# Patient Record
Sex: FEMALE | Race: WHITE | Employment: OTHER | URBAN - METROPOLITAN AREA
[De-identification: names, ages, dates, MRNs, and addresses within clinical notes are randomized per-mention and may not be internally consistent; named-entity substitution may affect disease eponyms.]

---

## 2017-02-16 DIAGNOSIS — I10 HTN (HYPERTENSION), BENIGN: ICD-10-CM

## 2017-02-16 RX ORDER — LISINOPRIL AND HYDROCHLOROTHIAZIDE 10; 12.5 MG/1; MG/1
TABLET ORAL
Qty: 90 TAB | Refills: 1 | Status: SHIPPED | OUTPATIENT
Start: 2017-02-16 | End: 2017-03-14 | Stop reason: SINTOL

## 2017-03-14 ENCOUNTER — OFFICE VISIT (OUTPATIENT)
Dept: INTERNAL MEDICINE CLINIC | Age: 77
End: 2017-03-14

## 2017-03-14 VITALS
TEMPERATURE: 98 F | BODY MASS INDEX: 26.5 KG/M2 | SYSTOLIC BLOOD PRESSURE: 129 MMHG | HEIGHT: 62 IN | HEART RATE: 81 BPM | DIASTOLIC BLOOD PRESSURE: 77 MMHG | WEIGHT: 144 LBS

## 2017-03-14 DIAGNOSIS — I10 HTN (HYPERTENSION), BENIGN: ICD-10-CM

## 2017-03-14 DIAGNOSIS — Z01.00 EYE EXAM, ROUTINE: ICD-10-CM

## 2017-03-14 DIAGNOSIS — G47.00 PERSISTENT DISORDER OF INITIATING OR MAINTAINING SLEEP: ICD-10-CM

## 2017-03-14 DIAGNOSIS — T46.4X5A ACE-INHIBITOR COUGH: ICD-10-CM

## 2017-03-14 DIAGNOSIS — E78.5 DYSLIPIDEMIA: ICD-10-CM

## 2017-03-14 DIAGNOSIS — R05.8 ACE-INHIBITOR COUGH: ICD-10-CM

## 2017-03-14 DIAGNOSIS — Z00.00 ROUTINE GENERAL MEDICAL EXAMINATION AT A HEALTH CARE FACILITY: ICD-10-CM

## 2017-03-14 DIAGNOSIS — E03.3 POSTINFECTIOUS HYPOTHYROIDISM: Primary | ICD-10-CM

## 2017-03-14 RX ORDER — CODEINE PHOSPHATE AND GUAIFENESIN 10; 100 MG/5ML; MG/5ML
5 SOLUTION ORAL
Qty: 180 ML | Refills: 0 | Status: SHIPPED | OUTPATIENT
Start: 2017-03-14 | End: 2017-09-28 | Stop reason: ALTCHOICE

## 2017-03-14 RX ORDER — ZOLPIDEM TARTRATE 5 MG/1
5 TABLET ORAL
Qty: 30 TAB | Refills: 0 | Status: SHIPPED | OUTPATIENT
Start: 2017-03-14 | End: 2017-09-28 | Stop reason: SDUPTHER

## 2017-03-14 RX ORDER — LOSARTAN POTASSIUM AND HYDROCHLOROTHIAZIDE 12.5; 5 MG/1; MG/1
1 TABLET ORAL DAILY
Qty: 90 TAB | Refills: 1 | Status: SHIPPED | OUTPATIENT
Start: 2017-03-14 | End: 2017-07-19 | Stop reason: SDUPTHER

## 2017-03-14 NOTE — PROGRESS NOTES
SUBJECTIVE: Shonda Mendoza is a 77 y.o. female seen for a follow up visit; she has hypertension, hyperlipidemia and no known cardiovascular conditions. Current Outpatient Prescriptions   Medication Sig Dispense Refill    zolpidem (AMBIEN) 5 mg tablet Take 1 Tab by mouth nightly as needed. 30 Tab 0    guaiFENesin-codeine (ROBITUSSIN AC) 100-10 mg/5 mL solution Take 5 mL by mouth three (3) times daily as needed for Cough. Max Daily Amount: 15 mL. 180 mL 0    losartan-hydroCHLOROthiazide (HYZAAR) 50-12.5 mg per tablet Take 1 Tab by mouth daily. 90 Tab 1    levothyroxine (SYNTHROID) 50 mcg tablet TAKE 1 TABLET DAILY BEFORE BREAKFAST 90 Tab 3    olopatadine 0.7 % drop Apply 1 Drop to eye two (2) times daily as needed. Each eye daily 3 Bottle 1    selenium sulfide (SELSUN) 2.5 % shampoo As directed 120 mL 2    aspirin delayed-release 81 mg tablet Take 81 mg by mouth daily.  diphenhydrAMINE (BENADRYL) 25 mg capsule Take 25 mg by mouth nightly as needed. Takes one nightly      MULTIVITAMINS (MULTIVITAMIN PO) Take  by mouth daily.  loratadine (CLARITIN) 10 mg tablet Take 10 mg by mouth daily.  conjugated estrogens (PREMARIN) 0.625 mg/gram vaginal cream Insert 0.5 g into vagina every Monday, Thursday, Saturday. 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 New concerns: cough at times.      OBJECTIVE:  Visit Vitals    /77 (BP 1 Location: Left arm, BP Patient Position: Sitting)    Pulse 81    Temp 98 °F (36.7 °C) (Oral)    Ht 5' 2\" (1.575 m)    Wt 144 lb (65.3 kg)    BMI 26.34 kg/m2      Appearance: alert, well appearing, and in no distress, oriented to person, place, and time and normal appearing weight. General exam: CVS exam BP noted to be well controlled today in office, S1, S2 normal, no gallop, no murmur, chest clear, no JVD, no HSM, no edema. Lab review: orders written for new lab studies as appropriate; see orders. ASSESSMENT:  hypertension well controlled, side effect of ace cough so switch to ARB, hyperlipidemia poorly controlled, needs further observation, may need treating. PLAN:  the following changes are made - see orders. Thyroid ROS: denies fatigue, weight changes, heat/cold intolerance, bowel/skin changes or CVS symptoms. Lab Results   Component Value Date/Time    TSH 2.290 04/20/2016 12:00 AM     Exam: thyroid is normal in size without nodules or tenderness. Assessment:  hypothyroidism stable. Plan: current treatment plan effective, no change in therapy. This is a Subsequent Medicare Annual Wellness Visit providing Personalized Prevention Plan Services (PPPS) (Performed 12 months after initial AWV and PPPS )    I have reviewed the patient's medical history in detail and updated the computerized patient record.      History     Past Medical History:   Diagnosis Date    Allergic rhinitis     Arthritis     spinal stenosis    Family history of colon cancer 2/12/2013    Hemorrhoids     Leg cramps     Other ill-defined conditions(799.89) 2010    multiple cortisone injections to back    Persistent disorder of initiating or maintaining sleep     Spinal stenosis 1/21/2011    Thyroid disease     borderline      Past Surgical History:   Procedure Laterality Date    HX REFRACTIVE SURGERY      HX TONSIL AND ADENOIDECTOMY      [place] COLONOSCOPY FLX DX W/COLLJ SPEC WHEN PFRMD 11/30/2010          Current Outpatient Prescriptions   Medication Sig Dispense Refill    zolpidem (AMBIEN) 5 mg tablet Take 1 Tab by mouth nightly as needed. 30 Tab 0    guaiFENesin-codeine (ROBITUSSIN AC) 100-10 mg/5 mL solution Take 5 mL by mouth three (3) times daily as needed for Cough. Max Daily Amount: 15 mL. 180 mL 0    losartan-hydroCHLOROthiazide (HYZAAR) 50-12.5 mg per tablet Take 1 Tab by mouth daily. 90 Tab 1    levothyroxine (SYNTHROID) 50 mcg tablet TAKE 1 TABLET DAILY BEFORE BREAKFAST 90 Tab 3    olopatadine 0.7 % drop Apply 1 Drop to eye two (2) times daily as needed. Each eye daily 3 Bottle 1    selenium sulfide (SELSUN) 2.5 % shampoo As directed 120 mL 2    aspirin delayed-release 81 mg tablet Take 81 mg by mouth daily.  diphenhydrAMINE (BENADRYL) 25 mg capsule Take 25 mg by mouth nightly as needed. Takes one nightly      MULTIVITAMINS (MULTIVITAMIN PO) Take  by mouth daily.  loratadine (CLARITIN) 10 mg tablet Take 10 mg by mouth daily.  conjugated estrogens (PREMARIN) 0.625 mg/gram vaginal cream Insert 0.5 g into vagina every Monday, Thursday, Saturday.  45 g 10     Allergies   Allergen Reactions    Other Medication Anaphylaxis     Allergic to lobster (specific)     Family History   Problem Relation Age of Onset    Heart Disease Mother     Asthma Mother     Hypertension Mother     Cancer Father      colon    Hypertension Father     Thyroid Disease Sister     Heart Disease Brother     Hypertension Brother     Stroke Brother      Social History   Substance Use Topics    Smoking status: Former Smoker     Quit date: 11/29/1969    Smokeless tobacco: Never Used      Comment: quit years ago    Alcohol use 1.5 oz/week     3 Glasses of wine per week     Patient Active Problem List   Diagnosis Code    Allergic rhinitis 477    Leg cramps R25.2    Hemorrhoids 455    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 Requires assistance with: no ADLs    Fall Risk     Fall Risk Assessment, last 12 mths 3/14/2017   Able to walk? Yes   Fall in past 12 months? No     Abuse Screen   Patient is not abused    Review of Systems   A comprehensive review of systems was negative except for that written in the HPI. Physical Examination     Evaluation of Cognitive Function:  Mood/affect:  happy  Appearance: age appropriate and casually dressed  Family member/caregiver input: no    Visit Vitals    /77 (BP 1 Location: Left arm, BP Patient Position: Sitting)    Pulse 81    Temp 98 °F (36.7 °C) (Oral)    Ht 5' 2\" (1.575 m)    Wt 144 lb (65.3 kg)    BMI 26.34 kg/m2     General appearance: alert, cooperative, no distress, appears stated age    Patient Care Team:  Avery Loving MD as PCP - General (Internal Medicine)    Advice/Referrals/Counseling   Education and counseling provided:  Are appropriate based on today's review and evaluation  End-of-Life planning (with patient's consent)  Pneumococcal Vaccine  Influenza Vaccine  Screening Mammography  Colorectal cancer screening tests      Assessment/Plan   Carlos A Cunha was seen today for hypertension. Diagnoses and all orders for this visit:    Postinfectious hypothyroidism  -     TSH 3RD GENERATION    Persistent disorder of initiating or maintaining sleep  -     zolpidem (AMBIEN) 5 mg tablet; Take 1 Tab by mouth nightly as needed.     HTN (hypertension), benign  -     TSH 3RD GENERATION  -     LIPID PANEL  -     CBC WITH AUTOMATED DIFF  -     METABOLIC PANEL, COMPREHENSIVE    Dyslipidemia  -     TSH 3RD GENERATION  -     LIPID PANEL  -     CBC WITH AUTOMATED DIFF  -     METABOLIC PANEL, COMPREHENSIVE    Eye exam, routine  -     REFERRAL TO OPHTHALMOLOGY    ACE-inhibitor cough    Routine general medical examination at a health care facility    Other orders  -     guaiFENesin-codeine (ROBITUSSIN AC) 100-10 mg/5 mL solution; Take 5 mL by mouth three (3) times daily as needed for Cough. Max Daily Amount: 15 mL. -     losartan-hydroCHLOROthiazide (HYZAAR) 50-12.5 mg per tablet; Take 1 Tab by mouth daily. Mabeline Sink

## 2017-03-14 NOTE — MR AVS SNAPSHOT
Visit Information Date & Time Provider Department Dept. Phone Encounter #  
 3/14/2017  9:45 AM Deirdre Obregon MD [facility] [phone] [id number] Upcoming Health Maintenance Date Due  
 MEDICARE YEARLY EXAM 4/21/2017 GLAUCOMA SCREENING Q2Y 3/31/2018 COLONOSCOPY 2/12/2020 DTaP/Tdap/Td series (2 - Td) 11/12/2020 Allergies as of 3/14/2017  Review Complete On: 3/14/2017 By: Lillian Teague LPN Severity Noted Reaction Type Reactions Other Medication High 11/29/2010   Systemic Anaphylaxis Allergic to lobster (specific) Current Immunizations  Reviewed on 10/19/2016 Name Date Influenza High Dose Vaccine PF 10/19/2016, 1/8/2016 Influenza Vaccine 9/23/2013, 10/12/2012 Pneumococcal Conjugate (PCV-13) 5/30/2016 Pneumococcal Vaccine (Unspecified Type) 1/21/2011 Tdap 11/12/2010 Zoster Vaccine, Live 2/1/2012 Not reviewed this visit You Were Diagnosed With   
  
 Codes Comments Postinfectious hypothyroidism    -  Primary ICD-10-CM: E03.3 ICD-9-CM: 244.9 Persistent disorder of initiating or maintaining sleep     ICD-10-CM: G47.00 ICD-9-CM: 307.42   
 HTN (hypertension), benign     ICD-10-CM: I10 
ICD-9-CM: 401.1 Dyslipidemia     ICD-10-CM: E78.5 ICD-9-CM: 272.4 Eye exam, routine     ICD-10-CM: Z01.00 ICD-9-CM: V72.0 ACE-inhibitor cough     ICD-10-CM: R05, T46.4X5A 
ICD-9-CM: 786.2, E942.6 Vitals BP Pulse Temp Height(growth percentile) Weight(growth percentile) BMI  
 129/77 (BP 1 Location: Left arm, BP Patient Position: Sitting) 81 98 °F (36.7 °C) (Oral) 5' 2\" (1.575 m) 144 lb (65.3 kg) 26.34 kg/m2 OB Status Smoking Status Postmenopausal Former Smoker Vitals History BMI and BSA Data Body Mass Index Body Surface Area  
 26.34 kg/m 2 1.69 m 2 Preferred Pharmacy Pharmacy Name Phone [address] [phone] Your Updated Medication List  
  
   
This list is accurate as of: 3/14/17 10:29 AM.  Always use your most recent med list.  
  
  
  
  
 aspirin delayed-release 81 mg tablet Take 81 mg by mouth daily. BENADRYL 25 mg capsule Generic drug:  diphenhydrAMINE Take 25 mg by mouth nightly as needed. Takes one nightly  
  
 conjugated estrogens 0.625 mg/gram vaginal cream  
Commonly known as:  PREMARIN Insert 0.5 g into vagina every Monday, Thursday, Saturday. guaiFENesin-codeine 100-10 mg/5 mL solution Commonly known as:  ROBITUSSIN AC Take 5 mL by mouth three (3) times daily as needed for Cough. Max Daily Amount: 15 mL. levothyroxine 50 mcg tablet Commonly known as:  SYNTHROID  
TAKE 1 TABLET DAILY BEFORE BREAKFAST  
  
 loratadine 10 mg tablet Commonly known as:  Coco Yuval Take 10 mg by mouth daily. losartan-hydroCHLOROthiazide 50-12.5 mg per tablet Commonly known as:  HYZAAR Take 1 Tab by mouth daily. MULTIVITAMIN PO Take  by mouth daily. olopatadine 0.7 % Drop Apply 1 Drop to eye two (2) times daily as needed. Each eye daily  
  
 selenium sulfide 2.5 % shampoo Commonly known as:  Fleurette Batters As directed  
  
 zolpidem 5 mg tablet Commonly known as:  AMBIEN Take 1 Tab by mouth nightly as needed. Prescriptions Printed Refills  
 zolpidem (AMBIEN) 5 mg tablet 0 Sig: Take 1 Tab by mouth nightly as needed. Class: Print Route: Oral  
 guaiFENesin-codeine (ROBITUSSIN AC) 100-10 mg/5 mL solution 0 Sig: Take 5 mL by mouth three (3) times daily as needed for Cough. Max Daily Amount: 15 mL. Class: Print Route: Oral  
  
Prescriptions Sent to Pharmacy Refills  
 losartan-hydroCHLOROthiazide (HYZAAR) 50-12.5 mg per tablet 1 Sig: Take 1 Tab by mouth daily.   
 Class: Normal  
 Pharmacy: [facility], [facility] [place] [place] [address] #: [phone] Route: Oral  
  
We Performed the Following CBC WITH AUTOMATED DIFF [50295 CPT(R)] LIPID PANEL [79717 CPT(R)] METABOLIC PANEL, COMPREHENSIVE [56580 CPT(R)] REFERRAL TO OPHTHALMOLOGY [REF57 Custom] TSH 3RD GENERATION [65538 CPT(R)] Referral Information Referral ID Referred By Referred To  
  
 [id number] Monica Miranda. Jed Karimi DO, [facility]   
   [place], [address] Visits Status Start Date End Date 1 New Request 3/14/17 3/14/18 If your referral has a status of pending review or denied, additional information will be sent to support the outcome of this decision. Introducing [facility] & HEALTH SERVICES! Dear Ruth Simms: 
Thank you for requesting a [employer] account. Our records indicate that you already have an active [employer] account. You can access your account anytime at [email or web address] Pay by [employer]pping (deal united)/Zetera Did you know that you can access your hospital and ER discharge instructions at any time in [employer]? You can also review all of your test results from your hospital stay or ER visit. Additional Information If you have questions, please visit the Frequently Asked Questions section of the [employer] website at [email or web address] by [employer] (deal united)/[email or web address]/. Remember, [employer] is NOT to be used for urgent needs. For medical emergencies, dial 911. Now available from your iPhone and Android! Please provide this summary of care documentation to your next provider. Your primary care clinician is listed as Delia Schmidt. If you have any questions after today's visit, please call [phone].

## 2017-03-15 LAB
ALBUMIN SERPL-MCNC: 4.9 G/DL (ref 3.5–4.8)
ALBUMIN/GLOB SERPL: 2.3 {RATIO} (ref 1.2–2.2)
ALP SERPL-CCNC: 69 IU/L (ref 39–117)
ALT SERPL-CCNC: 16 IU/L (ref 0–32)
AST SERPL-CCNC: 18 IU/L (ref 0–40)
BASOPHILS # BLD AUTO: 0 X10E3/UL (ref 0–0.2)
BASOPHILS NFR BLD AUTO: 0 %
BILIRUB SERPL-MCNC: 0.5 MG/DL (ref 0–1.2)
BUN SERPL-MCNC: 17 MG/DL (ref 8–27)
BUN/CREAT SERPL: 22 (ref 11–26)
CALCIUM SERPL-MCNC: 10 MG/DL (ref 8.7–10.3)
CHLORIDE SERPL-SCNC: 96 MMOL/L (ref 96–106)
CHOLEST SERPL-MCNC: 278 MG/DL (ref 100–199)
CO2 SERPL-SCNC: 25 MMOL/L (ref 18–29)
CREAT SERPL-MCNC: 0.76 MG/DL (ref 0.57–1)
EOSINOPHIL # BLD AUTO: 0.5 X10E3/UL (ref 0–0.4)
EOSINOPHIL NFR BLD AUTO: 6 %
ERYTHROCYTE [DISTWIDTH] IN BLOOD BY AUTOMATED COUNT: 13.1 % (ref 12.3–15.4)
GLOBULIN SER CALC-MCNC: 2.1 G/DL (ref 1.5–4.5)
GLUCOSE SERPL-MCNC: 105 MG/DL (ref 65–99)
HCT VFR BLD AUTO: 40.8 % (ref 34–46.6)
HDLC SERPL-MCNC: 119 MG/DL
HGB BLD-MCNC: 13.7 G/DL (ref 11.1–15.9)
IMM GRANULOCYTES # BLD: 0 X10E3/UL (ref 0–0.1)
IMM GRANULOCYTES NFR BLD: 0 %
INTERPRETATION, 910389: NORMAL
LDLC SERPL CALC-MCNC: 144 MG/DL (ref 0–99)
LYMPHOCYTES # BLD AUTO: 1.8 X10E3/UL (ref 0.7–3.1)
LYMPHOCYTES NFR BLD AUTO: 24 %
MCH RBC QN AUTO: 31.1 PG (ref 26.6–33)
MCHC RBC AUTO-ENTMCNC: 33.6 G/DL (ref 31.5–35.7)
MCV RBC AUTO: 93 FL (ref 79–97)
MONOCYTES # BLD AUTO: 0.6 X10E3/UL (ref 0.1–0.9)
MONOCYTES NFR BLD AUTO: 8 %
NEUTROPHILS # BLD AUTO: 4.6 X10E3/UL (ref 1.4–7)
NEUTROPHILS NFR BLD AUTO: 62 %
PLATELET # BLD AUTO: 317 X10E3/UL (ref 150–379)
POTASSIUM SERPL-SCNC: 4.6 MMOL/L (ref 3.5–5.2)
PROT SERPL-MCNC: 7 G/DL (ref 6–8.5)
RBC # BLD AUTO: 4.41 X10E6/UL (ref 3.77–5.28)
SODIUM SERPL-SCNC: 139 MMOL/L (ref 134–144)
TRIGL SERPL-MCNC: 73 MG/DL (ref 0–149)
TSH SERPL DL<=0.005 MIU/L-ACNC: 2.48 UIU/ML (ref 0.45–4.5)
VLDLC SERPL CALC-MCNC: 15 MG/DL (ref 5–40)
WBC # BLD AUTO: 7.6 X10E3/UL (ref 3.4–10.8)

## 2017-03-16 RX ORDER — ATORVASTATIN CALCIUM 20 MG/1
20 TABLET, FILM COATED ORAL DAILY
Qty: 90 TAB | Refills: 3 | Status: SHIPPED | OUTPATIENT
Start: 2017-03-16 | End: 2018-05-18 | Stop reason: SDUPTHER

## 2017-07-19 RX ORDER — LOSARTAN POTASSIUM AND HYDROCHLOROTHIAZIDE 12.5; 5 MG/1; MG/1
TABLET ORAL
Qty: 90 TAB | Refills: 1 | Status: SHIPPED | OUTPATIENT
Start: 2017-07-19 | End: 2017-12-15 | Stop reason: SDUPTHER

## 2017-07-19 RX ORDER — LEVOTHYROXINE SODIUM 50 UG/1
TABLET ORAL
Qty: 90 TAB | Refills: 3 | Status: SHIPPED | OUTPATIENT
Start: 2017-07-19 | End: 2017-09-28 | Stop reason: SDUPTHER

## 2017-09-28 ENCOUNTER — OFFICE VISIT (OUTPATIENT)
Dept: INTERNAL MEDICINE CLINIC | Age: 77
End: 2017-09-28

## 2017-09-28 VITALS
DIASTOLIC BLOOD PRESSURE: 80 MMHG | HEIGHT: 62 IN | HEART RATE: 96 BPM | WEIGHT: 144 LBS | SYSTOLIC BLOOD PRESSURE: 116 MMHG | BODY MASS INDEX: 26.5 KG/M2 | OXYGEN SATURATION: 98 % | RESPIRATION RATE: 16 BRPM

## 2017-09-28 DIAGNOSIS — Z23 ENCOUNTER FOR IMMUNIZATION: ICD-10-CM

## 2017-09-28 DIAGNOSIS — M24.549 CONTRACTURE OF HAND JOINT, UNSPECIFIED LATERALITY: ICD-10-CM

## 2017-09-28 DIAGNOSIS — G47.00 PERSISTENT DISORDER OF INITIATING OR MAINTAINING SLEEP: ICD-10-CM

## 2017-09-28 DIAGNOSIS — I10 HTN (HYPERTENSION), BENIGN: Primary | ICD-10-CM

## 2017-09-28 RX ORDER — ZOLPIDEM TARTRATE 5 MG/1
5 TABLET ORAL
Qty: 30 TAB | Refills: 0 | Status: SHIPPED | OUTPATIENT
Start: 2017-09-28 | End: 2018-03-28 | Stop reason: SDUPTHER

## 2017-09-28 RX ORDER — LEVOTHYROXINE SODIUM 50 UG/1
TABLET ORAL
Qty: 30 TAB | Refills: 0 | Status: SHIPPED | OUTPATIENT
Start: 2017-09-28 | End: 2018-08-16 | Stop reason: SDUPTHER

## 2017-09-28 NOTE — PROGRESS NOTES
Chief Complaint   Patient presents with    Follow-up     6 mon      SUBJECTIVE: Yanet Lopez is a 77 y.o. female seen for a follow up visit; she has hypertension, hyperlipidemia and no known cardiovascular conditions. Current Outpatient Prescriptions   Medication Sig Dispense Refill    losartan-hydroCHLOROthiazide (HYZAAR) 50-12.5 mg per tablet TAKE 1 TABLET EVERY DAY 90 Tab 1    levothyroxine (SYNTHROID) 50 mcg tablet TAKE 1 TABLET EVERY DAY BEFORE BREAKFAST 90 Tab 3    atorvastatin (LIPITOR) 20 mg tablet Take 1 Tab by mouth daily. 90 Tab 3    zolpidem (AMBIEN) 5 mg tablet Take 1 Tab by mouth nightly as needed. 30 Tab 0    olopatadine 0.7 % drop Apply 1 Drop to eye two (2) times daily as needed. Each eye daily 3 Bottle 1    selenium sulfide (SELSUN) 2.5 % shampoo As directed 120 mL 2    conjugated estrogens (PREMARIN) 0.625 mg/gram vaginal cream Insert 0.5 g into vagina every Monday, Thursday, Saturday. 45 g 10    aspirin delayed-release 81 mg tablet Take 81 mg by mouth daily.  diphenhydrAMINE (BENADRYL) 25 mg capsule Take 25 mg by mouth nightly as needed. Takes one nightly      MULTIVITAMINS (MULTIVITAMIN PO) Take  by mouth daily.  loratadine (CLARITIN) 10 mg tablet Take 10 mg by mouth daily.        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 New concerns: hand tingling rare and  Contractures bilat    OBJECTIVE:  Visit Vitals    /60    Pulse 96    Resp 16    Ht 5' 2\" (1.575 m)    Wt 144 lb (65.3 kg)    SpO2 98%    BMI 26.34 kg/m2      Appearance: alert, well appearing, and in no distress and oriented to person, place, and time. General exam: CVS exam BP noted to be well controlled today in office, S1, S2 normal, no gallop, no murmur, chest clear, no JVD, no HSM, no edema. dup contractures bilat minimal  Lab review: labs reviewed, I note that lipids LDL result does not yet meet goal.     ASSESSMENT:  hypertension well controlled, hyperlipidemia no significant medication side effects noted, needs further observation, needs improvement. PLAN:  current treatment plan is effective, no change in therapy  lab results and schedule of future lab studies reviewed with patient  repeat labs ordered prior to next appointment  orders and follow up as documented in patient record  cardiovascular risk and specific lipid/LDL goals reviewed  discuss statin use. Wt Readings from Last 3 Encounters:   09/28/17 144 lb (65.3 kg)   03/14/17 144 lb (65.3 kg)   10/19/16 140 lb (63.5 kg)     BP Readings from Last 3 Encounters:   09/28/17 110/60   03/14/17 129/77   10/19/16 122/78           Hypertension controlled for age based on guidelines    Her exercise level wonderful    1. Persistent disorder of initiating or maintaining sleep  occ refill  - zolpidem (AMBIEN) 5 mg tablet; Take 1 Tab by mouth nightly as needed. Dispense: 30 Tab; Refill: 0    2. Encounter for immunization    - Influenza virus vaccine ([place] 80) 72 years and older (71063)    3. HTN (hypertension), benign  Controlled cough resolved off ace    4.  Contracture of hand joint, unspecified laterality  No treatment

## 2017-09-28 NOTE — MR AVS SNAPSHOT
Visit Information Date & Time Provider Department Dept. Phone Encounter #  
 9/28/2017  1:45 PM Benton Ramírez, [facility] [phone] [id number] Upcoming Health Maintenance Date Due INFLUENZA AGE 9 TO ADULT 8/1/2017 MEDICARE YEARLY EXAM 3/15/2018 GLAUCOMA SCREENING Q2Y 4/24/2019 COLONOSCOPY 2/12/2020 DTaP/Tdap/Td series (2 - Td) 11/12/2020 Allergies as of 9/28/2017  Review Complete On: 9/28/2017 By: Caryle Ny, LPN Severity Noted Reaction Type Reactions Other Medication High 11/29/2010   Systemic Anaphylaxis Allergic to lobster (specific) Ace Inhibitors  09/28/2017    Cough Current Immunizations  Reviewed on 10/19/2016 Name Date Influenza High Dose Vaccine PF  Incomplete, 10/19/2016, 1/8/2016 Influenza Vaccine 9/23/2013, 10/12/2012 Pneumococcal Conjugate (PCV-13) 5/30/2016 Tdap 11/12/2010 ZZZ-RETIRED (DO NOT USE) Pneumococcal Vaccine (Unspecified Type) 1/21/2011 Zoster Vaccine, Live 2/1/2012 Not reviewed this visit You Were Diagnosed With   
  
 Codes Comments Persistent disorder of initiating or maintaining sleep    -  Primary ICD-10-CM: G47.00 ICD-9-CM: 307.42 Encounter for immunization     ICD-10-CM: V58 ICD-9-CM: V03.89 Vitals BP Pulse Resp Height(growth percentile) Weight(growth percentile) SpO2  
 116/80 96 16 5' 2\" (1.575 m) 144 lb (65.3 kg) 98% BMI OB Status Smoking Status 26.34 kg/m2 Postmenopausal Former Smoker Vitals History BMI and BSA Data Body Mass Index Body Surface Area  
 26.34 kg/m 2 1.69 m 2 Preferred Pharmacy Pharmacy Name Phone [id number] [place] [phone] Your Updated Medication List  
  
   
This list is accurate as of: 9/28/17  2:20 PM.  Always use your most recent med list.  
  
  
  
  
 aspirin delayed-release 81 mg tablet Take 81 mg by mouth daily. atorvastatin 20 mg tablet Commonly known as:  LIPITOR Take 1 Tab by mouth daily. BENADRYL 25 mg capsule Generic drug:  diphenhydrAMINE Take 25 mg by mouth nightly as needed. Takes one nightly  
  
 conjugated estrogens 0.625 mg/gram vaginal cream  
Commonly known as:  PREMARIN Insert 0.5 g into vagina every Monday, Thursday, Saturday. levothyroxine 50 mcg tablet Commonly known as:  SYNTHROID  
TAKE 1 TABLET EVERY DAY BEFORE BREAKFAST  
  
 loratadine 10 mg tablet Commonly known as:  Kathlmann Bird Take 10 mg by mouth daily. losartan-hydroCHLOROthiazide 50-12.5 mg per tablet Commonly known as:  HYZAAR  
TAKE 1 TABLET EVERY DAY  
  
 MULTIVITAMIN PO Take  by mouth daily. olopatadine 0.7 % Drop Apply 1 Drop to eye two (2) times daily as needed. Each eye daily  
  
 selenium sulfide 2.5 % shampoo Commonly known as:  Alonzo Reynolds As directed  
  
 zolpidem 5 mg tablet Commonly known as:  AMBIEN Take 1 Tab by mouth nightly as needed. Prescriptions Printed Refills  
 zolpidem (AMBIEN) 5 mg tablet 0 Sig: Take 1 Tab by mouth nightly as needed. Class: Print Route: Oral  
  
Prescriptions Sent to Pharmacy Refills  
 levothyroxine (SYNTHROID) 50 mcg tablet 0 Sig: TAKE 1 TABLET EVERY DAY BEFORE BREAKFAST Class: Normal  
 Pharmacy: [facility] [place], [facility] [place] Ph #: [phone] We Performed the Following INFLUENZA VIRUS VACCINE, HIGH DOSE SEASONAL, PRESERVATIVE FREE [57731 CPT(R)] Introducing [facility] [place] & [facility]! Dear [facility]: 
Thank you for requesting a [employer] account. Our records indicate that you already have an active [employer] account. You can access your account anytime at [email or web address] Did you know that you can access your hospital and ER discharge instructions at any time in [employer]?   You can also review all of your test results from your hospital stay or ER visit. Additional Information If you have questions, please visit the Frequently Asked Questions section of the [employer] website at [email or web address] Remember, [employer] is NOT to be used for urgent needs. For medical emergencies, dial 911. Now available from your iPhone and Android! Please provide this summary of care documentation to your next provider. Your primary care clinician is listed as Maggie Brown. If you have any questions after today's visit, please call [phone].

## 2017-12-16 RX ORDER — LOSARTAN POTASSIUM AND HYDROCHLOROTHIAZIDE 12.5; 5 MG/1; MG/1
TABLET ORAL
Qty: 90 TAB | Refills: 1 | Status: SHIPPED | OUTPATIENT
Start: 2017-12-16 | End: 2018-08-16 | Stop reason: SDUPTHER

## 2018-03-28 ENCOUNTER — OFFICE VISIT (OUTPATIENT)
Dept: INTERNAL MEDICINE CLINIC | Age: 78
End: 2018-03-28

## 2018-03-28 VITALS
RESPIRATION RATE: 16 BRPM | HEIGHT: 62 IN | BODY MASS INDEX: 27.23 KG/M2 | HEART RATE: 64 BPM | OXYGEN SATURATION: 98 % | DIASTOLIC BLOOD PRESSURE: 80 MMHG | WEIGHT: 148 LBS | SYSTOLIC BLOOD PRESSURE: 142 MMHG

## 2018-03-28 DIAGNOSIS — E78.5 DYSLIPIDEMIA: ICD-10-CM

## 2018-03-28 DIAGNOSIS — I10 HTN (HYPERTENSION), BENIGN: Primary | ICD-10-CM

## 2018-03-28 DIAGNOSIS — Z13.39 SCREENING FOR ALCOHOLISM: ICD-10-CM

## 2018-03-28 DIAGNOSIS — G47.00 PERSISTENT DISORDER OF INITIATING OR MAINTAINING SLEEP: ICD-10-CM

## 2018-03-28 DIAGNOSIS — Z00.00 MEDICARE ANNUAL WELLNESS VISIT, SUBSEQUENT: ICD-10-CM

## 2018-03-28 DIAGNOSIS — E03.9 ADULT HYPOTHYROIDISM: ICD-10-CM

## 2018-03-28 RX ORDER — ZOLPIDEM TARTRATE 5 MG/1
5 TABLET ORAL
Qty: 30 TAB | Refills: 0 | Status: SHIPPED | OUTPATIENT
Start: 2018-03-28 | End: 2019-02-07 | Stop reason: SDUPTHER

## 2018-03-28 NOTE — PROGRESS NOTES
Chief Complaint   Patient presents with   [employer] Annual Wellness Visit     need lab done,          This is the Subsequent Medicare Annual Wellness Exam, performed 12 months or more after the Initial AWV or the last Subsequent AWV    I have reviewed the patient's medical history in detail and updated the computerized patient record. History     Past Medical History:   Diagnosis Date    Allergic rhinitis     Arthritis     spinal stenosis    Family history of colon cancer 2/12/2013    Hemorrhoids     Leg cramps     Other ill-defined conditions(799.89) 2010    multiple cortisone injections to back    Persistent disorder of initiating or maintaining sleep     Spinal stenosis 1/21/2011    Thyroid disease     borderline      Past Surgical History:   Procedure Laterality Date    HX REFRACTIVE SURGERY      HX TONSIL AND ADENOIDECTOMY      [place] COLONOSCOPY FLX DX W/COLLJ SPEC WHEN PFRMD  11/30/2010          Current Outpatient Prescriptions   Medication Sig Dispense Refill    losartan-hydroCHLOROthiazide (HYZAAR) 50-12.5 mg per tablet TAKE 1 TABLET EVERY DAY 90 Tab 1    levothyroxine (SYNTHROID) 50 mcg tablet TAKE 1 TABLET EVERY DAY BEFORE BREAKFAST 30 Tab 0    zolpidem (AMBIEN) 5 mg tablet Take 1 Tab by mouth nightly as needed. 30 Tab 0    atorvastatin (LIPITOR) 20 mg tablet Take 1 Tab by mouth daily. 90 Tab 3    olopatadine 0.7 % drop Apply 1 Drop to eye two (2) times daily as needed. Each eye daily 3 Bottle 1    selenium sulfide (SELSUN) 2.5 % shampoo As directed 120 mL 2    conjugated estrogens (PREMARIN) 0.625 mg/gram vaginal cream Insert 0.5 g into vagina every Monday, Thursday, Saturday. 45 g 10    aspirin delayed-release 81 mg tablet Take 81 mg by mouth daily.  diphenhydrAMINE (BENADRYL) 25 mg capsule Take 25 mg by mouth nightly as needed. Takes one nightly      MULTIVITAMINS (MULTIVITAMIN PO) Take  by mouth daily.  loratadine (CLARITIN) 10 mg tablet Take 10 mg by mouth daily. Allergies   Allergen Reactions    Other Medication Anaphylaxis     Allergic to lobster (specific)    Ace Inhibitors Cough     Family History   Problem Relation Age of Onset    Heart Disease Mother     Asthma Mother     Hypertension Mother     Cancer Father      colon    Hypertension Father     Thyroid Disease Sister     Heart Disease Brother     Hypertension Brother     Stroke Brother      Social History   Substance Use Topics    Smoking status: Former Smoker     Quit date: 11/29/1969    Smokeless tobacco: Never Used      Comment: quit years ago    Alcohol use 1.5 oz/week     3 Glasses of wine per week     Patient Active Problem List   Diagnosis Code    Allergic rhinitis 80    Leg cramps R25.2    Hemorrhoids 65    Persistent disorder of initiating or maintaining sleep G47.00    Spinal stenosis M48.00    Adult hypothyroidism E03.9    Family history of colon cancer Z80.0    HTN (hypertension), benign I10       Depression Risk Factor Screening:     PHQ over the last two weeks 3/28/2018   Little interest or pleasure in doing things Not at all   Feeling down, depressed or hopeless -   Total Score PHQ 2 -     Alcohol Risk Factor Screening: You do not drink alcohol or very rarely. Functional Ability and Level of Safety:   Hearing Loss  Hearing is good. Activities of Daily Living  The home contains: no safety equipment. Patient does total self care    Fall Risk  Fall Risk Assessment, last 12 mths 3/28/2018   Able to walk? Yes   Fall in past 12 months?  No       Abuse Screen  Patient is not abused    Cognitive Screening   Evaluation of Cognitive Function:  Has your family/caregiver stated any concerns about your memory: no  Normal    Patient Care Team   Patient Care Team:  Herlinda Fernandez MD as PCP - General (Internal Medicine)    Assessment/Plan   Education and counseling provided:  Are appropriate based on today's review and evaluation  End-of-Life planning (with patient's consent)    Diagnoses and all orders for this visit:    1. Persistent disorder of initiating or maintaining sleep  -     zolpidem (AMBIEN) 5 mg tablet; Take 1 Tab by mouth nightly as needed. 2. Medicare annual wellness visit, subsequent    3. Screening for alcoholism  -     Annual  Alcohol Screen 15 min ()        Health Maintenance Due   Topic Date Due    MEDICARE YEARLY EXAM  03/15/2018           SUBJECTIVE: Jas Thorne is a 69 y.o. female seen for a follow up visit; she has hypertension, hyperlipidemia and no known cardiovascular conditions. Current Outpatient Prescriptions   Medication Sig Dispense Refill    losartan-hydroCHLOROthiazide (HYZAAR) 50-12.5 mg per tablet TAKE 1 TABLET EVERY DAY 90 Tab 1    levothyroxine (SYNTHROID) 50 mcg tablet TAKE 1 TABLET EVERY DAY BEFORE BREAKFAST 30 Tab 0    zolpidem (AMBIEN) 5 mg tablet Take 1 Tab by mouth nightly as needed. 30 Tab 0    atorvastatin (LIPITOR) 20 mg tablet Take 1 Tab by mouth daily. (Patient taking differently: Take 10 mg by mouth daily.) 90 Tab 3    olopatadine 0.7 % drop Apply 1 Drop to eye two (2) times daily as needed. Each eye daily 3 Bottle 1    selenium sulfide (SELSUN) 2.5 % shampoo As directed 120 mL 2    conjugated estrogens (PREMARIN) 0.625 mg/gram vaginal cream Insert 0.5 g into vagina every Monday, Thursday, Saturday. 45 g 10    aspirin delayed-release 81 mg tablet Take 81 mg by mouth daily.  diphenhydrAMINE (BENADRYL) 25 mg capsule Take 25 mg by mouth nightly as needed. Takes one nightly      MULTIVITAMINS (MULTIVITAMIN PO) Take  by mouth daily.  loratadine (CLARITIN) 10 mg tablet Take 10 mg by mouth daily.        Patient Active Problem List   Diagnosis Code    Allergic rhinitis 80    Leg cramps R25.2    Hemorrhoids 455    Persistent disorder of initiating or maintaining sleep G47.00    Spinal stenosis M48.00    Adult hypothyroidism E03.9    Family history of colon cancer Z80.0    HTN (hypertension), benign I10     System Review: Cardiovascular ROS - taking medications as instructed, no medication side effects noted, patient does not perform home BP monitoring, no TIA's, no chest pain on exertion, no dyspnea on exertion, no swelling of ankles. New concerns: on 10 lipitor  1/2 pill of 20 mg. OBJECTIVE:  Visit Vitals    /78    Pulse 64    Resp 16    Ht 5' 2\" (1.575 m)    Wt 148 lb (67.1 kg)    SpO2 98%    BMI 27.07 kg/m2      Appearance: alert, well appearing, and in no distress and oriented to person, place, and time. General exam: CVS exam BP noted to be well controlled today in office, S1, S2 normal, no gallop, no murmur, chest clear, no JVD, no HSM, no edema. Lab review: orders written for new lab studies as appropriate; see orders. ASSESSMENT:  hypertension stable, hyperlipidemia first lab after a year of statin low dose. PLAN:  current treatment plan is effective, no change in therapy  lab results and schedule of future lab studies reviewed with patient  repeat labs ordered prior to next appointment  orders and follow up as documented in patient record  reviewed diet, exercise and weight control  recommended sodium restriction. Advance Care Planning (ACP) Provider Conversation Snapshot    Date of ACP Conversation: 03/28/18  Persons included in Conversation:  patient  Length of ACP Conversation in minutes:  <16 minutes (Non-Billable)    Authorized Decision Maker (if patient is incapable of making informed decisions):    This person is:             For Patients with Decision Making Capacity:   Values/Goals: Exploration of values, goals, and preferences if recovery is not expected, even with continued medical treatment in the event of:  Imminent death    Conversation Outcomes / Follow-Up Plan:   Recommended completion of Advance Directive form after review of ACP materials and conversation with prospective healthcare agent   Recommended communicating the plan and making copies for the healthcare agent, personal physician, and others as appropriate (e.g., health system)  Recommended review of completed ACP document annually or upon change in health status    Thyroid ROS: denies fatigue, weight changes, heat/cold intolerance, bowel/skin changes or CVS symptoms. Lab Results   Component Value Date/Time    TSH 2.480 03/14/2017 10:35 AM     Exam: thyroid is normal in size without nodules or tenderness. Assessment:  hypothyroidism stable. Plan: current treatment plan effective, no change in therapy  lab results and schedule of future related lab tests reviewed with patient. .  1. Persistent disorder of initiating or maintaining sleep  Only on long flights  - zolpidem (AMBIEN) 5 mg tablet; Take 1 Tab by mouth nightly as needed. Dispense: 30 Tab; Refill: 0    2. Medicare annual wellness visit, subsequent  Diet exercise    3. Screening for alcoholism  minimal  - Annual  Alcohol Screen 15 min ()    4. HTN (hypertension), benign  Fair to good    5. Dyslipidemia  labs  - CBC WITH AUTOMATED DIFF  - LIPID PANEL  - METABOLIC PANEL, COMPREHENSIVE    6.  Adult hypothyroidism  stable  - TSH 3RD GENERATION

## 2018-03-28 NOTE — PROGRESS NOTES
Coordination of Care Questions    1. Have you been to the ER, urgent care clinic since your last visit? No       Hospitalized since your last visit? No    2. Have you seen or consulted any other health care providers outside of the [address] [place] [address] since your last visit? Include any pap smears or colon screening.  No

## 2018-03-28 NOTE — PATIENT INSTRUCTIONS

## 2018-03-29 LAB
ALBUMIN SERPL-MCNC: 4.7 G/DL (ref 3.5–4.8)
ALBUMIN/GLOB SERPL: 2.2 {RATIO} (ref 1.2–2.2)
ALP SERPL-CCNC: 70 IU/L (ref 39–117)
ALT SERPL-CCNC: 19 IU/L (ref 0–32)
AST SERPL-CCNC: 26 IU/L (ref 0–40)
BASOPHILS # BLD AUTO: 0 X10E3/UL (ref 0–0.2)
BASOPHILS NFR BLD AUTO: 0 %
BILIRUB SERPL-MCNC: 0.5 MG/DL (ref 0–1.2)
BUN SERPL-MCNC: 13 MG/DL (ref 8–27)
BUN/CREAT SERPL: 16 (ref 12–28)
CALCIUM SERPL-MCNC: 9.5 MG/DL (ref 8.7–10.3)
CHLORIDE SERPL-SCNC: 100 MMOL/L (ref 96–106)
CHOLEST SERPL-MCNC: 208 MG/DL (ref 100–199)
CO2 SERPL-SCNC: 25 MMOL/L (ref 18–29)
CREAT SERPL-MCNC: 0.8 MG/DL (ref 0.57–1)
EOSINOPHIL # BLD AUTO: 0.2 X10E3/UL (ref 0–0.4)
EOSINOPHIL NFR BLD AUTO: 4 %
ERYTHROCYTE [DISTWIDTH] IN BLOOD BY AUTOMATED COUNT: 13 % (ref 12.3–15.4)
GFR SERPLBLD CREATININE-BSD FMLA CKD-EPI: 72 ML/MIN/1.73
GFR SERPLBLD CREATININE-BSD FMLA CKD-EPI: 83 ML/MIN/1.73
GLOBULIN SER CALC-MCNC: 2.1 G/DL (ref 1.5–4.5)
GLUCOSE SERPL-MCNC: 99 MG/DL (ref 65–99)
HCT VFR BLD AUTO: 40.4 % (ref 34–46.6)
HDLC SERPL-MCNC: 122 MG/DL
HGB BLD-MCNC: 13.3 G/DL (ref 11.1–15.9)
IMM GRANULOCYTES # BLD: 0 X10E3/UL (ref 0–0.1)
IMM GRANULOCYTES NFR BLD: 0 %
INTERPRETATION, 910389: NORMAL
LDLC SERPL CALC-MCNC: 75 MG/DL (ref 0–99)
LYMPHOCYTES # BLD AUTO: 1.6 X10E3/UL (ref 0.7–3.1)
LYMPHOCYTES NFR BLD AUTO: 31 %
MCH RBC QN AUTO: 30.1 PG (ref 26.6–33)
MCHC RBC AUTO-ENTMCNC: 32.9 G/DL (ref 31.5–35.7)
MCV RBC AUTO: 91 FL (ref 79–97)
MONOCYTES # BLD AUTO: 0.4 X10E3/UL (ref 0.1–0.9)
MONOCYTES NFR BLD AUTO: 8 %
NEUTROPHILS # BLD AUTO: 2.9 X10E3/UL (ref 1.4–7)
NEUTROPHILS NFR BLD AUTO: 57 %
PLATELET # BLD AUTO: 276 X10E3/UL (ref 150–379)
POTASSIUM SERPL-SCNC: 4.3 MMOL/L (ref 3.5–5.2)
PROT SERPL-MCNC: 6.8 G/DL (ref 6–8.5)
RBC # BLD AUTO: 4.42 X10E6/UL (ref 3.77–5.28)
SODIUM SERPL-SCNC: 141 MMOL/L (ref 134–144)
TRIGL SERPL-MCNC: 54 MG/DL (ref 0–149)
TSH SERPL DL<=0.005 MIU/L-ACNC: 2.7 UIU/ML (ref 0.45–4.5)
VLDLC SERPL CALC-MCNC: 11 MG/DL (ref 5–40)
WBC # BLD AUTO: 5.1 X10E3/UL (ref 3.4–10.8)

## 2018-04-09 ENCOUNTER — HOSPITAL ENCOUNTER (OUTPATIENT)
Dept: MAMMOGRAPHY | Age: 78
Discharge: HOME | End: 2018-04-09
Payer: MEDICARE

## 2018-04-09 DIAGNOSIS — Z12.31 SCREENING MAMMOGRAM, ENCOUNTER FOR: ICD-10-CM

## 2018-04-09 PROCEDURE — 77067 SCR MAMMO BI INCL CAD: CPT

## 2018-05-18 RX ORDER — ATORVASTATIN CALCIUM 20 MG/1
TABLET, FILM COATED ORAL
Qty: 90 TAB | Refills: 3 | Status: SHIPPED | OUTPATIENT
Start: 2018-05-18 | End: 2019-05-03 | Stop reason: SDUPTHER

## 2018-08-16 RX ORDER — LEVOTHYROXINE SODIUM 50 UG/1
TABLET ORAL
Qty: 90 TAB | Refills: 3 | Status: SHIPPED | OUTPATIENT
Start: 2018-08-16 | End: 2019-06-18 | Stop reason: SDUPTHER

## 2018-08-16 RX ORDER — LOSARTAN POTASSIUM AND HYDROCHLOROTHIAZIDE 12.5; 5 MG/1; MG/1
TABLET ORAL
Qty: 90 TAB | Refills: 1 | Status: SHIPPED | OUTPATIENT
Start: 2018-08-16 | End: 2019-01-25 | Stop reason: SDUPTHER

## 2019-01-25 RX ORDER — LOSARTAN POTASSIUM AND HYDROCHLOROTHIAZIDE 12.5; 5 MG/1; MG/1
TABLET ORAL
Qty: 90 TAB | Refills: 1 | Status: SHIPPED | OUTPATIENT
Start: 2019-01-25 | End: 2019-06-18 | Stop reason: SDUPTHER

## 2019-02-07 ENCOUNTER — OFFICE VISIT (OUTPATIENT)
Dept: INTERNAL MEDICINE CLINIC | Age: 79
End: 2019-02-07

## 2019-02-07 VITALS
DIASTOLIC BLOOD PRESSURE: 75 MMHG | SYSTOLIC BLOOD PRESSURE: 130 MMHG | HEART RATE: 63 BPM | RESPIRATION RATE: 18 BRPM | WEIGHT: 150 LBS | BODY MASS INDEX: 27.6 KG/M2 | OXYGEN SATURATION: 99 % | HEIGHT: 62 IN | TEMPERATURE: 97.5 F

## 2019-02-07 DIAGNOSIS — I10 HTN (HYPERTENSION), BENIGN: ICD-10-CM

## 2019-02-07 DIAGNOSIS — E03.9 ADULT HYPOTHYROIDISM: Primary | ICD-10-CM

## 2019-02-07 DIAGNOSIS — G47.00 PERSISTENT DISORDER OF INITIATING OR MAINTAINING SLEEP: ICD-10-CM

## 2019-02-07 RX ORDER — ZOLPIDEM TARTRATE 5 MG/1
5 TABLET ORAL
Qty: 30 TAB | Refills: 0 | Status: SHIPPED | OUTPATIENT
Start: 2019-02-07 | End: 2021-12-08 | Stop reason: SDUPTHER

## 2019-02-07 NOTE — PROGRESS NOTES
SUBJECTIVE: Fernando Marshall is a 69 y.o. female seen for a follow up visit; she has hypertension, hyperlipidemia and no known cardiovascular conditions. Current Outpatient Medications Medication Sig Dispense Refill  zolpidem (AMBIEN) 5 mg tablet Take 1 Tab by mouth nightly as needed. 30 Tab 0  
 losartan-hydroCHLOROthiazide (HYZAAR) 50-12.5 mg per tablet TAKE 1 TABLET EVERY DAY 90 Tab 1  
 levothyroxine (SYNTHROID) 50 mcg tablet TAKE 1 TABLET EVERY DAY BEFORE BREAKFAST 90 Tab 3  
 atorvastatin (LIPITOR) 20 mg tablet TAKE 1 TABLET EVERY DAY 90 Tab 3  
 olopatadine 0.7 % drop Apply 1 Drop to eye two (2) times daily as needed. Each eye daily 3 Bottle 1  
 selenium sulfide (SELSUN) 2.5 % shampoo As directed 120 mL 2  
 conjugated estrogens (PREMARIN) 0.625 mg/gram vaginal cream Insert 0.5 g into vagina every Monday, Thursday, Saturday. 45 g 10  
 aspirin delayed-release 81 mg tablet Take 81 mg by mouth daily.  diphenhydrAMINE (BENADRYL) 25 mg capsule Take 25 mg by mouth nightly as needed. Takes one nightly  MULTIVITAMINS (MULTIVITAMIN PO) Take  by mouth daily.  loratadine (CLARITIN) 10 mg tablet Take 10 mg by mouth daily. Patient Active Problem List  
Diagnosis Code  Allergic rhinitis 477  Leg cramps R25.2  Hemorrhoids 455  Persistent disorder of initiating or maintaining sleep G47.00  Spinal stenosis M48.00  
 Adult hypothyroidism E03.9  Family history of colon cancer Z80.0  
 HTN (hypertension), benign I10 System Review: Cardiovascular ROS - taking medications as instructed, no medication side effects noted, no TIA's, no chest pain on exertion, no dyspnea on exertion, no swelling of ankles, no orthostatic dizziness or lightheadedness, no intermittent claudication symptoms, no muscle aches or pain, CNS ROS - no TIA or stroke-like symptoms, no amaurosis, diplopia, abnormal speech, unilateral numbness or weakness. New concerns: balance a little worse traveling alot. Feels well plays golf OBJECTIVE: 
Visit Vitals /76 (BP 1 Location: Right arm, BP Patient Position: Sitting) Pulse 63 Temp 97.5 °F (36.4 °C) (Oral) Resp 18 Ht 5' 2\" (1.575 m) Wt 150 lb (68 kg) SpO2 99% BMI 27.44 kg/m² Appearance: alert, well appearing, and in no distress, oriented to person, place, and time and normal appearing weight. General exam: CVS exam BP noted to be well controlled today in office, S1, S2 normal, no gallop, no murmur, chest clear, no JVD, no HSM, no edema. Lab review: orders written for new lab studies as appropriate; see orders. ASSESSMENT: 
hypertension stable, hyperlipidemia stable. PLAN: 
current treatment plan is effective, no change in therapy 
lab results and schedule of future lab studies reviewed with patient. Thyroid ROS: fatigue and weight gain. Lab Results Component Value Date/Time TSH 2.700 03/28/2018 11:20 AM  
 
Exam: thyroid is normal in size without nodules or tenderness. Assessment:  hypothyroidism stable. Plan: current treatment plan effective, no change in therapy 
orders as documented in EMR 
lab results and schedule of future related lab tests reviewed with patient. Diagnoses and all orders for this visit: 1. Adult hypothyroidism 
-     TSH 3RD GENERATION 2. HTN (hypertension), benign 
-     CBC WITH AUTOMATED DIFF 
-     LIPID PANEL 
-     METABOLIC PANEL, COMPREHENSIVE 3. Persistent disorder of initiating or maintaining sleep 
-     zolpidem (AMBIEN) 5 mg tablet; Take 1 Tab by mouth nightly as needed.

## 2019-02-07 NOTE — PROGRESS NOTES
1. Have you been to the ER, urgent care clinic since your last visit? Hospitalized since your last visit? No 
 
2. Have you seen or consulted any other health care providers outside of the [address] [place] [address] since your last visit? Include any pap smears or colon screening.  No.

## 2019-02-08 LAB
ALBUMIN SERPL-MCNC: 4.6 G/DL (ref 3.5–4.8)
ALBUMIN/GLOB SERPL: 2 {RATIO} (ref 1.2–2.2)
ALP SERPL-CCNC: 69 IU/L (ref 39–117)
ALT SERPL-CCNC: 19 IU/L (ref 0–32)
AST SERPL-CCNC: 22 IU/L (ref 0–40)
BASOPHILS # BLD AUTO: 0 X10E3/UL (ref 0–0.2)
BASOPHILS NFR BLD AUTO: 0 %
BILIRUB SERPL-MCNC: 0.6 MG/DL (ref 0–1.2)
BUN SERPL-MCNC: 14 MG/DL (ref 8–27)
BUN/CREAT SERPL: 16 (ref 12–28)
CALCIUM SERPL-MCNC: 9.4 MG/DL (ref 8.7–10.3)
CHLORIDE SERPL-SCNC: 100 MMOL/L (ref 96–106)
CHOLEST SERPL-MCNC: 185 MG/DL (ref 100–199)
CO2 SERPL-SCNC: 26 MMOL/L (ref 20–29)
CREAT SERPL-MCNC: 0.88 MG/DL (ref 0.57–1)
EOSINOPHIL # BLD AUTO: 0.2 X10E3/UL (ref 0–0.4)
EOSINOPHIL NFR BLD AUTO: 4 %
ERYTHROCYTE [DISTWIDTH] IN BLOOD BY AUTOMATED COUNT: 13.3 % (ref 12.3–15.4)
GLOBULIN SER CALC-MCNC: 2.3 G/DL (ref 1.5–4.5)
GLUCOSE SERPL-MCNC: 94 MG/DL (ref 65–99)
HCT VFR BLD AUTO: 40.2 % (ref 34–46.6)
HDLC SERPL-MCNC: 109 MG/DL
HGB BLD-MCNC: 13.5 G/DL (ref 11.1–15.9)
IMM GRANULOCYTES # BLD AUTO: 0 X10E3/UL (ref 0–0.1)
IMM GRANULOCYTES NFR BLD AUTO: 0 %
INTERPRETATION, 910389: NORMAL
LDLC SERPL CALC-MCNC: 66 MG/DL (ref 0–99)
LYMPHOCYTES # BLD AUTO: 1.7 X10E3/UL (ref 0.7–3.1)
LYMPHOCYTES NFR BLD AUTO: 30 %
MCH RBC QN AUTO: 30.5 PG (ref 26.6–33)
MCHC RBC AUTO-ENTMCNC: 33.6 G/DL (ref 31.5–35.7)
MCV RBC AUTO: 91 FL (ref 79–97)
MONOCYTES # BLD AUTO: 0.4 X10E3/UL (ref 0.1–0.9)
MONOCYTES NFR BLD AUTO: 7 %
NEUTROPHILS # BLD AUTO: 3.3 X10E3/UL (ref 1.4–7)
NEUTROPHILS NFR BLD AUTO: 59 %
PLATELET # BLD AUTO: 264 X10E3/UL (ref 150–379)
POTASSIUM SERPL-SCNC: 4.4 MMOL/L (ref 3.5–5.2)
PROT SERPL-MCNC: 6.9 G/DL (ref 6–8.5)
RBC # BLD AUTO: 4.42 X10E6/UL (ref 3.77–5.28)
SODIUM SERPL-SCNC: 139 MMOL/L (ref 134–144)
TRIGL SERPL-MCNC: 50 MG/DL (ref 0–149)
TSH SERPL DL<=0.005 MIU/L-ACNC: 2.76 UIU/ML (ref 0.45–4.5)
VLDLC SERPL CALC-MCNC: 10 MG/DL (ref 5–40)
WBC # BLD AUTO: 5.5 X10E3/UL (ref 3.4–10.8)

## 2019-04-12 ENCOUNTER — HOSPITAL ENCOUNTER (OUTPATIENT)
Dept: MAMMOGRAPHY | Age: 79
Discharge: HOME | End: 2019-04-12
Attending: INTERNAL MEDICINE
Payer: MEDICARE

## 2019-04-12 DIAGNOSIS — Z12.31 VISIT FOR SCREENING MAMMOGRAM: ICD-10-CM

## 2019-04-12 PROCEDURE — 77067 SCR MAMMO BI INCL CAD: CPT

## 2019-05-04 RX ORDER — ATORVASTATIN CALCIUM 20 MG/1
TABLET, FILM COATED ORAL
Qty: 90 TAB | Refills: 3 | Status: SHIPPED | OUTPATIENT
Start: 2019-05-04 | End: 2020-08-03

## 2019-06-18 ENCOUNTER — PATIENT MESSAGE (OUTPATIENT)
Dept: INTERNAL MEDICINE CLINIC | Age: 79
End: 2019-06-18

## 2019-06-18 RX ORDER — LEVOTHYROXINE SODIUM 50 UG/1
TABLET ORAL
Qty: 90 TAB | Refills: 3 | Status: SHIPPED | OUTPATIENT
Start: 2019-06-18 | End: 2020-06-09

## 2019-06-18 RX ORDER — LOSARTAN POTASSIUM AND HYDROCHLOROTHIAZIDE 12.5; 5 MG/1; MG/1
TABLET ORAL
Qty: 90 TAB | Refills: 1 | Status: SHIPPED | OUTPATIENT
Start: 2019-06-18 | End: 2019-11-20 | Stop reason: SDUPTHER

## 2019-11-22 RX ORDER — LOSARTAN POTASSIUM AND HYDROCHLOROTHIAZIDE 12.5; 5 MG/1; MG/1
TABLET ORAL
Qty: 90 TAB | Refills: 0 | Status: SHIPPED | OUTPATIENT
Start: 2019-11-22 | End: 2020-04-02

## 2019-12-04 ENCOUNTER — HOSPITAL ENCOUNTER (OUTPATIENT)
Dept: LAB | Age: 79
Discharge: HOME | End: 2019-12-04

## 2019-12-04 ENCOUNTER — OFFICE VISIT (OUTPATIENT)
Dept: INTERNAL MEDICINE CLINIC | Age: 79
End: 2019-12-04

## 2019-12-04 VITALS
SYSTOLIC BLOOD PRESSURE: 150 MMHG | WEIGHT: 149 LBS | DIASTOLIC BLOOD PRESSURE: 77 MMHG | TEMPERATURE: 97 F | RESPIRATION RATE: 18 BRPM | HEART RATE: 53 BPM | BODY MASS INDEX: 27.42 KG/M2 | HEIGHT: 62 IN | OXYGEN SATURATION: 99 %

## 2019-12-04 DIAGNOSIS — I10 HTN (HYPERTENSION), BENIGN: ICD-10-CM

## 2019-12-04 DIAGNOSIS — E03.9 ADULT HYPOTHYROIDISM: Primary | ICD-10-CM

## 2019-12-04 DIAGNOSIS — E78.5 DYSLIPIDEMIA: ICD-10-CM

## 2019-12-04 DIAGNOSIS — Z13.39 SCREENING FOR ALCOHOLISM: ICD-10-CM

## 2019-12-04 DIAGNOSIS — Z00.00 MEDICARE ANNUAL WELLNESS VISIT, SUBSEQUENT: ICD-10-CM

## 2019-12-04 DIAGNOSIS — E03.9 ADULT HYPOTHYROIDISM: ICD-10-CM

## 2019-12-04 DIAGNOSIS — Z12.31 ENCOUNTER FOR SCREENING MAMMOGRAM FOR MALIGNANT NEOPLASM OF BREAST: ICD-10-CM

## 2019-12-04 DIAGNOSIS — Z78.0 POSTMENOPAUSAL STATE: ICD-10-CM

## 2019-12-04 DIAGNOSIS — H61.21 CERUMEN DEBRIS ON TYMPANIC MEMBRANE OF RIGHT EAR: ICD-10-CM

## 2019-12-04 LAB
ALBUMIN SERPL-MCNC: 4 G/DL (ref 3.5–5)
ALBUMIN/GLOB SERPL: 1.4 {RATIO} (ref 1.1–2.2)
ALP SERPL-CCNC: 84 U/L (ref 45–117)
ALT SERPL-CCNC: 27 U/L (ref 12–78)
ANION GAP SERPL CALC-SCNC: 6 MMOL/L (ref 5–15)
AST SERPL-CCNC: 20 U/L (ref 15–37)
BASOPHILS # BLD: 0.1 K/UL (ref 0–0.1)
BASOPHILS NFR BLD: 1 % (ref 0–1)
BILIRUB SERPL-MCNC: 0.8 MG/DL (ref 0.2–1)
BUN SERPL-MCNC: 17 MG/DL (ref 6–20)
BUN/CREAT SERPL: 23 (ref 12–20)
CALCIUM SERPL-MCNC: 9.1 MG/DL (ref 8.5–10.1)
CHLORIDE SERPL-SCNC: 104 MMOL/L (ref 97–108)
CHOLEST SERPL-MCNC: 205 MG/DL
CO2 SERPL-SCNC: 29 MMOL/L (ref 21–32)
CREAT SERPL-MCNC: 0.75 MG/DL (ref 0.55–1.02)
DIFFERENTIAL METHOD BLD: NORMAL
EOSINOPHIL # BLD: 0.3 K/UL (ref 0–0.4)
EOSINOPHIL NFR BLD: 5 % (ref 0–7)
ERYTHROCYTE [DISTWIDTH] IN BLOOD BY AUTOMATED COUNT: 12.3 % (ref 11.5–14.5)
GLOBULIN SER CALC-MCNC: 2.8 G/DL (ref 2–4)
GLUCOSE SERPL-MCNC: 100 MG/DL (ref 65–100)
HCT VFR BLD AUTO: 41.2 % (ref 35–47)
HDLC SERPL-MCNC: 120 MG/DL
HDLC SERPL: 1.7 {RATIO} (ref 0–5)
HGB BLD-MCNC: 12.8 G/DL (ref 11.5–16)
IMM GRANULOCYTES # BLD AUTO: 0 K/UL (ref 0–0.04)
IMM GRANULOCYTES NFR BLD AUTO: 0 % (ref 0–0.5)
LDLC SERPL CALC-MCNC: 76.6 MG/DL (ref 0–100)
LIPID PROFILE,FLP: ABNORMAL
LYMPHOCYTES # BLD: 1.7 K/UL (ref 0.8–3.5)
LYMPHOCYTES NFR BLD: 32 % (ref 12–49)
MCH RBC QN AUTO: 29.7 PG (ref 26–34)
MCHC RBC AUTO-ENTMCNC: 31.1 G/DL (ref 30–36.5)
MCV RBC AUTO: 95.6 FL (ref 80–99)
MONOCYTES # BLD: 0.5 K/UL (ref 0–1)
MONOCYTES NFR BLD: 9 % (ref 5–13)
NEUTS SEG # BLD: 2.9 K/UL (ref 1.8–8)
NEUTS SEG NFR BLD: 53 % (ref 32–75)
NRBC # BLD: 0 K/UL (ref 0–0.01)
NRBC BLD-RTO: 0 PER 100 WBC
PLATELET # BLD AUTO: 250 K/UL (ref 150–400)
PMV BLD AUTO: 10.1 FL (ref 8.9–12.9)
POTASSIUM SERPL-SCNC: 4.5 MMOL/L (ref 3.5–5.1)
PROT SERPL-MCNC: 6.8 G/DL (ref 6.4–8.2)
RBC # BLD AUTO: 4.31 M/UL (ref 3.8–5.2)
SODIUM SERPL-SCNC: 139 MMOL/L (ref 136–145)
TRIGL SERPL-MCNC: 42 MG/DL (ref ?–150)
TSH SERPL DL<=0.05 MIU/L-ACNC: 2.42 UIU/ML (ref 0.36–3.74)
VLDLC SERPL CALC-MCNC: 8.4 MG/DL
WBC # BLD AUTO: 5.5 K/UL (ref 3.6–11)

## 2019-12-04 NOTE — PROGRESS NOTES
This is the Subsequent Medicare Annual Wellness Exam, performed 12 months or more after the Initial AWV or the last Subsequent AWV    I have reviewed the patient's medical history in detail and updated the computerized patient record. History     Patient Active Problem List   Diagnosis Code    Allergic rhinitis 80    Leg cramps R25.2    Hemorrhoids 455    Persistent disorder of initiating or maintaining sleep G47.00    Spinal stenosis M48.00    Adult hypothyroidism E03.9    Family history of colon cancer Z80.0    HTN (hypertension), benign I10     Past Medical History:   Diagnosis Date    Allergic rhinitis     Arthritis     spinal stenosis    Family history of colon cancer 2/12/2013    Hemorrhoids     Leg cramps     Other ill-defined conditions(799.89) 2010    multiple cortisone injections to back    Persistent disorder of initiating or maintaining sleep     Spinal stenosis 1/21/2011    Thyroid disease     borderline      Past Surgical History:   Procedure Laterality Date    HX REFRACTIVE SURGERY      HX TONSIL AND ADENOIDECTOMY      [place] COLONOSCOPY FLX DX W/COLLJ SPEC WHEN PFRMD  11/30/2010          Current Outpatient Medications   Medication Sig Dispense Refill    losartan-hydroCHLOROthiazide (HYZAAR) 50-12.5 mg per tablet TAKE 1 TABLET EVERY DAY 90 Tab 0    levothyroxine (SYNTHROID) 50 mcg tablet TAKE 1 TABLET EVERY DAY BEFORE BREAKFAST 90 Tab 3    atorvastatin (LIPITOR) 20 mg tablet TAKE 1 TABLET EVERY DAY 90 Tab 3    zolpidem (AMBIEN) 5 mg tablet Take 1 Tab by mouth nightly as needed. 30 Tab 0    olopatadine 0.7 % drop Apply 1 Drop to eye two (2) times daily as needed. Each eye daily 3 Bottle 1    selenium sulfide (SELSUN) 2.5 % shampoo As directed 120 mL 2    conjugated estrogens (PREMARIN) 0.625 mg/gram vaginal cream Insert 0.5 g into vagina every Monday, Thursday, Saturday. 45 g 10    aspirin delayed-release 81 mg tablet Take 81 mg by mouth daily.       diphenhydrAMINE (BENADRYL) 25 mg capsule Take 25 mg by mouth nightly as needed. Takes one nightly      MULTIVITAMINS (MULTIVITAMIN PO) Take  by mouth daily.  loratadine (CLARITIN) 10 mg tablet Take 10 mg by mouth daily. Allergies   Allergen Reactions    Other Medication Anaphylaxis     Allergic to lobster (specific)    Ace Inhibitors Cough       Family History   Problem Relation Age of Onset    Heart Disease Mother     Asthma Mother     Hypertension Mother     Cancer Father         colon    Hypertension Father     Thyroid Disease Sister     Heart Disease Brother     Hypertension Brother     Stroke Brother      Social History     Tobacco Use    Smoking status: Former Smoker     Last attempt to quit: 1969     Years since quittin.0    Smokeless tobacco: Never Used    Tobacco comment: quit years ago   Substance Use Topics    Alcohol use: Yes     Alcohol/week: 2.5 standard drinks     Types: 3 Glasses of wine per week       Depression Risk Factor Screening:     3 most recent PHQ Screens 2019   Little interest or pleasure in doing things Not at all   Feeling down, depressed, irritable, or hopeless Not at all   Total Score PHQ 2 0       Alcohol Risk Factor Screening:   Do you average 1 drink per night or more than 7 drinks a week:  No    On any one occasion in the past three months have you have had more than 3 drinks containing alcohol:  No      Functional Ability and Level of Safety:   Hearing: Hearing is good. Activities of Daily Living: The home contains: no safety equipment. Patient does total self care    Ambulation: with no difficulty    Fall Risk:  Fall Risk Assessment, last 12 mths 2019   Able to walk? Yes   Fall in past 12 months?  No       Abuse Screen:  Patient is not abused    Cognitive Screening   Has your family/caregiver stated any concerns about your memory: no  Cognitive Screening: Normal - Verbal Fluency Test    Patient Care Team   Patient Care Team:  Pa Kenny MD as PCP - General (Internal Medicine)  Irina Davis MD as PCP - [facility] Empaneled Provider    Assessment/Plan   Education and counseling provided:  Are appropriate based on today's review and evaluation  End-of-Life planning (with patient's consent)    Diagnoses and all orders for this visit:    1. Adult hypothyroidism  -     TSH 3RD GENERATION; Future    2. HTN (hypertension), benign  -     CBC WITH AUTOMATED DIFF; Future  -     LIPID PANEL; Future  -     METABOLIC PANEL, COMPREHENSIVE; Future    3. Dyslipidemia  -     LIPID PANEL; Future    4. Medicare annual wellness visit, subsequent    5. Screening for alcoholism  -     [place] ANNUAL ALCOHOL SCREEN 15 MIN    6. Postmenopausal state  -     DEXA BONE DENSITY STUDY AXIAL; Future    7. Encounter for screening mammogram for malignant neoplasm of breast  -     [facility] MAMMO BI SCREENING INCL CAD; Future        Health Maintenance Due   Topic Date Due    Shingrix Vaccine Age 49> (1 of 2) 11/20/1991    Pneumococcal 65+ years (2 of 2 - PPSV23) 05/30/2017    MEDICARE YEARLY EXAM  03/29/2019    COLONOSCOPY  02/12/2020     SUBJECTIVE: Carine Araiza is a 67 y.o. female seen for a follow up visit; she has hypertension, hyperlipidemia and hypothyroid  . Current Outpatient Medications   Medication Sig Dispense Refill    losartan-hydroCHLOROthiazide (HYZAAR) 50-12.5 mg per tablet TAKE 1 TABLET EVERY DAY 90 Tab 0    levothyroxine (SYNTHROID) 50 mcg tablet TAKE 1 TABLET EVERY DAY BEFORE BREAKFAST 90 Tab 3    atorvastatin (LIPITOR) 20 mg tablet TAKE 1 TABLET EVERY DAY 90 Tab 3    zolpidem (AMBIEN) 5 mg tablet Take 1 Tab by mouth nightly as needed. 30 Tab 0    olopatadine 0.7 % drop Apply 1 Drop to eye two (2) times daily as needed. Each eye daily 3 Bottle 1    selenium sulfide (SELSUN) 2.5 % shampoo As directed 120 mL 2    conjugated estrogens (PREMARIN) 0.625 mg/gram vaginal cream Insert 0.5 g into vagina every Monday, Thursday, Saturday.  45 g 10    aspirin delayed-release 81 mg tablet Take 81 mg by mouth daily.  diphenhydrAMINE (BENADRYL) 25 mg capsule Take 25 mg by mouth nightly as needed. Takes one nightly      MULTIVITAMINS (MULTIVITAMIN PO) Take  by mouth daily.  loratadine (CLARITIN) 10 mg tablet Take 10 mg by mouth daily. Patient Active Problem List   Diagnosis Code    Allergic rhinitis 80    Leg cramps R25.2    Hemorrhoids 455    Persistent disorder of initiating or maintaining sleep G47.00    Spinal stenosis M48.00    Adult hypothyroidism E03.9    Family history of colon cancer Z80.0    HTN (hypertension), benign I10     System Review: Cardiovascular ROS - taking medications as instructed, no medication side effects noted, patient does not perform home BP monitoring, no TIA's, no chest pain on exertion, no dyspnea on exertion, no swelling of ankles, CNS ROS - no TIA or stroke-like symptoms. New concerns: none but no home BP. Has wax right tried debrox not effecting hearing ( normal screen)    OBJECTIVE:  Visit Vitals  /77 (BP 1 Location: Left arm, BP Patient Position: Sitting)   Pulse (!) 53   Temp 97 °F (36.1 °C) (Oral)   Resp 18   Ht 5' 2\" (1.575 m)   Wt 149 lb (67.6 kg)   SpO2 99%   BMI 27.25 kg/m²      Appearance: alert, well appearing, and in no distress, oriented to person, place, and time and normal appearing weight. General exam: CVS exam BP noted to be mildly elevated today in office, S1, S2 normal, no gallop, no murmur, chest clear, no JVD, no HSM, no edema. Minor wax right canal  Lab review: orders written for new lab studies as appropriate; see orders, no lab studies available for review at time of visit. ASSESSMENT:  hypertension borderline controlled, hyperlipidemia control uncertain. PLAN:  current treatment plan is effective, no change in therapy  . home bp monitoring. Sprint study guideline for 75 or older BP control , 130/80 in non frail elderly is appropriate for this patient  1.  Adult hypothyroidism  labs  - TSH 3RD GENERATION; Future    2. HTN (hypertension), benign  Fair control off meds today  - CBC WITH AUTOMATED DIFF; Future  - LIPID PANEL; Future  - METABOLIC PANEL, COMPREHENSIVE; Future    3. Dyslipidemia  follow  - LIPID PANEL; Future    4. Medicare annual wellness visit, subsequent      5. Screening for alcoholism  no  - [place] ANNUAL ALCOHOL SCREEN 15 MIN    6. Postmenopausal state  due  - DEXA BONE DENSITY STUDY AXIAL; Future    7. Encounter for screening mammogram for malignant neoplasm of breast  due  - [facility] MAMMO BI SCREENING INCL CAD; Future    8.  Cerumen debris on tympanic membrane of right ear  Minor can use debrox prn

## 2019-12-04 NOTE — PATIENT INSTRUCTIONS
Medicare Wellness Visit, Female The best way to live healthy is to have a lifestyle where you eat a well-balanced diet, exercise regularly, limit alcohol use, and quit all forms of tobacco/nicotine, if applicable. Regular preventive services are another way to keep healthy. Preventive services (vaccines, screening tests, monitoring & exams) can help personalize your care plan, which helps you manage your own care. Screening tests can find health problems at the earliest stages, when they are easiest to treat. [facility] follows the current, evidence-based guidelines published by the [facility] ([facility]) when recommending preventive services for our patients. Because we follow these guidelines, sometimes recommendations change over time as research supports it. (For example, mammograms used to be recommended annually. Even though Medicare will still pay for an annual mammogram, the newer guidelines recommend a mammogram every two years for women of average risk). Of course, you and your doctor may decide to screen more often for some diseases, based on your risk and your co-morbidities (chronic disease you are already diagnosed with). Preventive services for you include: - Medicare offers their members a free annual wellness visit, which is time for you and your primary care provider to discuss and plan for your preventive service needs. Take advantage of this benefit every year! 
-All adults over the age of 72 should receive the recommended pneumonia vaccines. Current USPSTF guidelines recommend a series of two vaccines for the best pneumonia protection.  
-All adults should have a flu vaccine yearly and a tetanus vaccine every 10 years.  
-All adults age 48 and older should receive the shingles vaccines (series of two vaccines). -All adults age 38-68 who are overweight should have a diabetes screening test once every three years. -All adults born between 80 and 1965 should be screened once for Hepatitis C. 
-Other screening tests and preventive services for persons with diabetes include: an eye exam to screen for diabetic retinopathy, a kidney function test, a foot exam, and stricter control over your cholesterol.  
-Cardiovascular screening for adults with routine risk involves an electrocardiogram (ECG) at intervals determined by your doctor.  
-Colorectal cancer screenings should be done for adults age 54-65 with no increased risk factors for colorectal cancer. There are a number of acceptable methods of screening for this type of cancer. Each test has its own benefits and drawbacks. Discuss with your doctor what is most appropriate for you during your annual wellness visit. The different tests include: colonoscopy (considered the best screening method), a fecal occult blood test, a fecal DNA test, and sigmoidoscopy. 
 
-A bone mass density test is recommended when a woman turns 65 to screen for osteoporosis. This test is only recommended one time, as a screening. Some providers will use this same test as a disease monitoring tool if you already have osteoporosis. -Breast cancer screenings are recommended every other year for women of normal risk, age 54-69. 
-Cervical cancer screenings for women over age 72 are only recommended with certain risk factors. Here is a list of your current Health Maintenance items (your personalized list of preventive services) with a due date: 
Health Maintenance Due Topic Date Due  Shingles Vaccine (1 of 2) 11/20/1991  Pneumococcal Vaccine (2 of 2 - PPSV23) 05/30/2017 [address] [place] [address] Annual Well Visit  03/29/2019  Colonoscopy  02/12/2020  
 
 
130/80 goal for BP

## 2019-12-04 NOTE — PROGRESS NOTES
1. Have you been to the ER, urgent care clinic since your last visit? Hospitalized since your last visit? No    2. Have you seen or consulted any other health care providers outside of the [address] [place] [address] since your last visit? Include any pap smears or colon screening.  Ophthamology-dr parr

## 2020-04-02 RX ORDER — LOSARTAN POTASSIUM AND HYDROCHLOROTHIAZIDE 12.5; 5 MG/1; MG/1
TABLET ORAL
Qty: 90 TAB | Refills: 0 | Status: SHIPPED | OUTPATIENT
Start: 2020-04-02 | End: 2020-09-17 | Stop reason: DRUGHIGH

## 2020-06-01 ENCOUNTER — HOSPITAL ENCOUNTER (OUTPATIENT)
Dept: MAMMOGRAPHY | Age: 80
Discharge: HOME | End: 2020-06-01
Attending: INTERNAL MEDICINE
Payer: MEDICARE

## 2020-06-01 ENCOUNTER — HOSPITAL ENCOUNTER (OUTPATIENT)
Dept: BONE DENSITY | Age: 80
Discharge: HOME | End: 2020-06-01
Attending: INTERNAL MEDICINE
Payer: MEDICARE

## 2020-06-01 DIAGNOSIS — Z78.0 POSTMENOPAUSAL STATE: ICD-10-CM

## 2020-06-01 DIAGNOSIS — Z12.31 ENCOUNTER FOR SCREENING MAMMOGRAM FOR MALIGNANT NEOPLASM OF BREAST: ICD-10-CM

## 2020-06-01 PROCEDURE — 77080 DXA BONE DENSITY AXIAL: CPT

## 2020-06-01 PROCEDURE — 77067 SCR MAMMO BI INCL CAD: CPT

## 2020-06-08 ENCOUNTER — HOSPITAL ENCOUNTER (OUTPATIENT)
Dept: MAMMOGRAPHY | Age: 80
Discharge: HOME | End: 2020-06-08
Attending: INTERNAL MEDICINE
Payer: MEDICARE

## 2020-06-08 DIAGNOSIS — R92.8 ABNORMALITY OF LEFT BREAST ON SCREENING MAMMOGRAM: ICD-10-CM

## 2020-06-08 DIAGNOSIS — R92.8 ABNORMAL MAMMOGRAM OF LEFT BREAST: ICD-10-CM

## 2020-06-08 PROCEDURE — 77061 BREAST TOMOSYNTHESIS UNI: CPT

## 2020-06-09 ENCOUNTER — PATIENT MESSAGE (OUTPATIENT)
Dept: PRIMARY CARE CLINIC | Age: 80
End: 2020-06-09

## 2020-06-09 DIAGNOSIS — Z01.00 EYE EXAM, ROUTINE: Primary | ICD-10-CM

## 2020-06-09 RX ORDER — LEVOTHYROXINE SODIUM 50 UG/1
TABLET ORAL
Qty: 90 TAB | Refills: 3 | Status: SHIPPED | OUTPATIENT
Start: 2020-06-09 | End: 2021-08-15

## 2020-06-10 NOTE — TELEPHONE ENCOUNTER
From: Karin Quick  To: Anna Lane MD  Sent: 6/9/2020 6:10 PM EDT  Subject: Referral Request    I have an appt with Dr Lurdes Stallworth for my eyes tomorrow at 10:00. Sorry for the late notice. Would you please send a referral? [phone]. Thank you.

## 2020-08-03 RX ORDER — ATORVASTATIN CALCIUM 20 MG/1
TABLET, FILM COATED ORAL
Qty: 90 TAB | Refills: 3 | Status: SHIPPED | OUTPATIENT
Start: 2020-08-03 | End: 2021-06-13

## 2020-09-17 ENCOUNTER — OFFICE VISIT (OUTPATIENT)
Dept: INTERNAL MEDICINE CLINIC | Age: 80
End: 2020-09-17
Payer: MEDICARE

## 2020-09-17 VITALS
RESPIRATION RATE: 18 BRPM | TEMPERATURE: 95 F | HEIGHT: 62 IN | WEIGHT: 151 LBS | DIASTOLIC BLOOD PRESSURE: 80 MMHG | BODY MASS INDEX: 27.79 KG/M2 | HEART RATE: 58 BPM | OXYGEN SATURATION: 100 % | SYSTOLIC BLOOD PRESSURE: 150 MMHG

## 2020-09-17 DIAGNOSIS — M47.819 SPONDYLO-ARTHROPATHY: Primary | ICD-10-CM

## 2020-09-17 DIAGNOSIS — Z23 NEEDS FLU SHOT: ICD-10-CM

## 2020-09-17 DIAGNOSIS — Z79.1 NSAID LONG-TERM USE: ICD-10-CM

## 2020-09-17 DIAGNOSIS — I10 HTN (HYPERTENSION), BENIGN: ICD-10-CM

## 2020-09-17 DIAGNOSIS — M48.07 SPINAL STENOSIS OF LUMBOSACRAL REGION: ICD-10-CM

## 2020-09-17 LAB
ANION GAP SERPL CALC-SCNC: 3 MMOL/L (ref 5–15)
BUN SERPL-MCNC: 21 MG/DL (ref 6–20)
BUN/CREAT SERPL: 27 (ref 12–20)
CALCIUM SERPL-MCNC: 9.1 MG/DL (ref 8.5–10.1)
CHLORIDE SERPL-SCNC: 103 MMOL/L (ref 97–108)
CO2 SERPL-SCNC: 30 MMOL/L (ref 21–32)
CREAT SERPL-MCNC: 0.79 MG/DL (ref 0.55–1.02)
GLUCOSE SERPL-MCNC: 83 MG/DL (ref 65–100)
POTASSIUM SERPL-SCNC: 4.2 MMOL/L (ref 3.5–5.1)
SODIUM SERPL-SCNC: 136 MMOL/L (ref 136–145)

## 2020-09-17 PROCEDURE — G8754 DIAS BP LESS 90: HCPCS | Performed by: INTERNAL MEDICINE

## 2020-09-17 PROCEDURE — 99214 OFFICE O/P EST MOD 30 MIN: CPT | Performed by: INTERNAL MEDICINE

## 2020-09-17 PROCEDURE — G8536 NO DOC ELDER MAL SCRN: HCPCS | Performed by: INTERNAL MEDICINE

## 2020-09-17 PROCEDURE — 1090F PRES/ABSN URINE INCON ASSESS: CPT | Performed by: INTERNAL MEDICINE

## 2020-09-17 PROCEDURE — G8399 PT W/DXA RESULTS DOCUMENT: HCPCS | Performed by: INTERNAL MEDICINE

## 2020-09-17 PROCEDURE — G8753 SYS BP > OR = 140: HCPCS | Performed by: INTERNAL MEDICINE

## 2020-09-17 PROCEDURE — G8427 DOCREV CUR MEDS BY ELIG CLIN: HCPCS | Performed by: INTERNAL MEDICINE

## 2020-09-17 PROCEDURE — 90694 VACC AIIV4 NO PRSRV 0.5ML IM: CPT | Performed by: INTERNAL MEDICINE

## 2020-09-17 PROCEDURE — G8419 CALC BMI OUT NRM PARAM NOF/U: HCPCS | Performed by: INTERNAL MEDICINE

## 2020-09-17 PROCEDURE — 1101F PT FALLS ASSESS-DOCD LE1/YR: CPT | Performed by: INTERNAL MEDICINE

## 2020-09-17 PROCEDURE — G0008 ADMIN INFLUENZA VIRUS VAC: HCPCS | Performed by: INTERNAL MEDICINE

## 2020-09-17 PROCEDURE — G8510 SCR DEP NEG, NO PLAN REQD: HCPCS | Performed by: INTERNAL MEDICINE

## 2020-09-17 RX ORDER — LOSARTAN POTASSIUM AND HYDROCHLOROTHIAZIDE 25; 100 MG/1; MG/1
1 TABLET ORAL DAILY
Qty: 90 TAB | Refills: 1 | Status: SHIPPED | OUTPATIENT
Start: 2020-09-17 | End: 2021-03-10

## 2020-09-17 RX ORDER — CHOLECALCIFEROL (VITAMIN D3) 125 MCG
440 CAPSULE ORAL 2 TIMES DAILY
COMMUNITY

## 2020-09-17 RX ORDER — OMEPRAZOLE 20 MG/1
20 CAPSULE, DELAYED RELEASE ORAL DAILY
Qty: 90 CAP | Refills: 1 | Status: SHIPPED | OUTPATIENT
Start: 2020-09-17 | End: 2021-03-10

## 2020-09-17 NOTE — PROGRESS NOTES
BP Readings from Last 3 Encounters:   09/17/20 (!) 150/80   12/04/19 150/77   02/07/19 130/75     Wt Readings from Last 3 Encounters:   09/17/20 151 lb (68.5 kg)   12/04/19 149 lb (67.6 kg)   02/07/19 150 lb (68 kg)     SUBJECTIVE:   Alethea Hoang is a 67 y.o. female who complains of low back pain for 4 month(s), positional with bending or lifting, with radiation down the legs. Precipitating factors: none recalled by the patient. Prior history of back problems: previous osteoarthritis of lumbar spine. There is no numbness in the legs. No bladder dysfunction  Subjective:  History of back problems more than ten years ago, at which time she was seen by Dr. Iesha Newell at [address] [place] [address] and had a workup. MRI showed lumbar spinal stenosis, fairly severe, and it showed some levels of spondylolisthesis. No surgery was advised. She tried pain management and some injections. She did not have much relief. She has not been evaluated for any other modalities of pain management. She tries to be pretty active. She cannot walk as far as she used to walk. She is not able to stand as long as she used to stand. Her back pain slows her down. She has been taking high risk medication, Aleve. She has been taking 440 mg twice a day and has been doing this for the last four months. It gives her some relief. She denies any GI upset. She is not sure if her blood pressure control has been good or not. She has been taking her medication. OBJECTIVE:  Visit Vitals  BP (!) 150/80   Pulse (!) 58   Temp (!) 95 °F (35 °C) (Temporal)   Resp 18   Ht 5' 2\" (1.575 m)   Wt 151 lb (68.5 kg)   SpO2 100%   BMI 27.62 kg/m²    S1 and S2 normal, no murmurs, clicks, gallops or rubs. Regular rate and rhythm. Chest is clear; no wheezes or rales. No edema or JVD. The abdomen is soft without tenderness, guarding, mass, rebound or organomegaly. Bowel sounds are normal. No CVA tenderness or inguinal adenopathy noted.     Patient appears to be in mild to moderate pain, antalgic gait noted. Lumbosacral spine area reveals no local tenderness or mass. Painful and reduced LS ROM noted. Straight leg raise is negative at 45 degrees on bilateral. DTR's, motor strength and sensation normal, including heel and toe gait. Peripheral pulses are palpable. X-Ray: shows DJD changes, likely chronic. MRI Results (most recent): MRI Results (most recent):        ASSESSMENT:   1. Needs flu shot    - FLU (FLUAD QUAD INFLUENZA VACCINE,QUAD,ADJUVANTED)    2. Spondylo-arthropathy  May be candidate for more intense treatments  - REFERRAL TO PAIN CLINIC    3. Spinal stenosis of lumbosacral region  chronic  - REFERRAL TO PAIN CLINIC    4. NSAID long-term use  Add PPI if stays on nsaid  - omeprazole (PRILOSEC) 20 mg capsule; Take 1 Cap by mouth daily. Dispense: 90 Cap; Refill: 1  - METABOLIC PANEL, BASIC; Future    5. HTN (hypertension), benign  Increase dose   See 3 months      PLAN:  For acute pain, rest, intermittent application of heat (do not sleep on heating pad), analgesics and muscle relaxants are recommended. Discussed longer term treatment plan of prn NSAID's and discussed a home back care exercise program with flexion exercise routine. Proper lifting with avoidance of heavy lifting discussed. Consider Physical Therapy and XRay studies if not improving. Call or return to clinic prn if these symptoms worsen or fail to improve as anticipated.

## 2020-09-17 NOTE — PROGRESS NOTES
Chief Complaint   Patient presents with    Back Pain     flare up     Immunization/Injection     high dose flu        1. Have you been to the ER, urgent care clinic since your last visit? Hospitalized since your last visit? No    2. Have you seen or consulted any other health care providers outside of the [address] [place] [address] since your last visit? Include any pap smears or colon screening.  No

## 2020-09-18 ENCOUNTER — DOCUMENTATION ONLY (OUTPATIENT)
Dept: INTERNAL MEDICINE CLINIC | Age: 80
End: 2020-09-18

## 2020-09-19 ENCOUNTER — PATIENT MESSAGE (OUTPATIENT)
Dept: INTERNAL MEDICINE CLINIC | Age: 80
End: 2020-09-19

## 2020-09-21 RX ORDER — OLOPATADINE HYDROCHLORIDE 2 MG/ML
1 SOLUTION/ DROPS OPHTHALMIC DAILY
Qty: 52 ML | Refills: 2 | Status: SHIPPED | OUTPATIENT
Start: 2020-09-21 | End: 2020-12-20

## 2020-09-21 NOTE — TELEPHONE ENCOUNTER
ARTIFICIAL TEARS to affected eye(s) as needed. From: Mavis Lema  To: Chris Pace MD  Sent: 9/19/2020 11:31 AM EDT  Subject: Prescription Question    Would you please prescribe the following for me? For some reason, even though she said she would refill it, Dr Gwynda Shone is not responding re this prescription. It's the [facility] on line service that you send everything else to. Thank you.    OLOPATADINE 0.2% OPTH SOL 2.5ML    Thanks

## 2020-10-01 ENCOUNTER — TRANSCRIBE ORDER (OUTPATIENT)
Dept: SCHEDULING | Age: 80
End: 2020-10-01

## 2020-10-01 DIAGNOSIS — M54.16 LUMBAR RADICULOPATHY: Primary | ICD-10-CM

## 2020-10-09 ENCOUNTER — HOSPITAL ENCOUNTER (OUTPATIENT)
Dept: MRI IMAGING | Age: 80
Discharge: HOME | End: 2020-10-09
Attending: SPECIALIST
Payer: MEDICARE

## 2020-10-09 DIAGNOSIS — M54.16 LUMBAR RADICULOPATHY: ICD-10-CM

## 2020-10-09 PROCEDURE — 72148 MRI LUMBAR SPINE W/O DYE: CPT

## 2020-12-17 ENCOUNTER — OFFICE VISIT (OUTPATIENT)
Dept: INTERNAL MEDICINE CLINIC | Age: 80
End: 2020-12-17
Payer: MEDICARE

## 2020-12-17 VITALS
RESPIRATION RATE: 18 BRPM | SYSTOLIC BLOOD PRESSURE: 145 MMHG | HEART RATE: 57 BPM | HEIGHT: 62 IN | DIASTOLIC BLOOD PRESSURE: 78 MMHG | OXYGEN SATURATION: 98 % | TEMPERATURE: 97.7 F | BODY MASS INDEX: 27.82 KG/M2 | WEIGHT: 151.2 LBS

## 2020-12-17 DIAGNOSIS — E78.5 DYSLIPIDEMIA: ICD-10-CM

## 2020-12-17 DIAGNOSIS — Z00.00 MEDICARE ANNUAL WELLNESS VISIT, SUBSEQUENT: ICD-10-CM

## 2020-12-17 DIAGNOSIS — Z13.31 SCREENING FOR DEPRESSION: ICD-10-CM

## 2020-12-17 DIAGNOSIS — E03.9 ADULT HYPOTHYROIDISM: ICD-10-CM

## 2020-12-17 DIAGNOSIS — I10 HTN (HYPERTENSION), BENIGN: Primary | ICD-10-CM

## 2020-12-17 DIAGNOSIS — M47.819 SPONDYLO-ARTHROPATHY: ICD-10-CM

## 2020-12-17 PROCEDURE — G8399 PT W/DXA RESULTS DOCUMENT: HCPCS | Performed by: INTERNAL MEDICINE

## 2020-12-17 PROCEDURE — G8754 DIAS BP LESS 90: HCPCS | Performed by: INTERNAL MEDICINE

## 2020-12-17 PROCEDURE — 3288F FALL RISK ASSESSMENT DOCD: CPT | Performed by: INTERNAL MEDICINE

## 2020-12-17 PROCEDURE — G8419 CALC BMI OUT NRM PARAM NOF/U: HCPCS | Performed by: INTERNAL MEDICINE

## 2020-12-17 PROCEDURE — 1100F PTFALLS ASSESS-DOCD GE2>/YR: CPT | Performed by: INTERNAL MEDICINE

## 2020-12-17 PROCEDURE — G8427 DOCREV CUR MEDS BY ELIG CLIN: HCPCS | Performed by: INTERNAL MEDICINE

## 2020-12-17 PROCEDURE — 99214 OFFICE O/P EST MOD 30 MIN: CPT | Performed by: INTERNAL MEDICINE

## 2020-12-17 PROCEDURE — G0439 PPPS, SUBSEQ VISIT: HCPCS | Performed by: INTERNAL MEDICINE

## 2020-12-17 PROCEDURE — G8536 NO DOC ELDER MAL SCRN: HCPCS | Performed by: INTERNAL MEDICINE

## 2020-12-17 PROCEDURE — G8510 SCR DEP NEG, NO PLAN REQD: HCPCS | Performed by: INTERNAL MEDICINE

## 2020-12-17 PROCEDURE — 1090F PRES/ABSN URINE INCON ASSESS: CPT | Performed by: INTERNAL MEDICINE

## 2020-12-17 PROCEDURE — G8753 SYS BP > OR = 140: HCPCS | Performed by: INTERNAL MEDICINE

## 2020-12-17 NOTE — PROGRESS NOTES
This is the Subsequent Medicare Annual Wellness Exam, performed 12 months or more after the Initial AWV or the last Subsequent AWV    I have reviewed the patient's medical history in detail and updated the computerized patient record. Depression Risk Factor Screening:     3 most recent PHQ Screens 12/17/2020   Little interest or pleasure in doing things Not at all   Feeling down, depressed, irritable, or hopeless Not at all   Total Score PHQ 2 0       Alcohol Risk Screen    Do you average more than 1 drink per night or more than 7 drinks a week:  No    On any one occasion in the past three months have you have had more than 3 drinks containing alcohol:  No        Functional Ability and Level of Safety:    Hearing: Hearing is good. Activities of Daily Living: The home contains: no safety equipment. Patient does total self care      Ambulation: with mild difficulty     Fall Risk:  Fall Risk Assessment, last 12 mths 12/17/2020   Able to walk? Yes   Fall in past 12 months? Yes   Fall with injury? No   Number of falls in past 12 months 1   Fall Risk Score 1      Abuse Screen:  Patient is not abused       Cognitive Screening    Has your family/caregiver stated any concerns about your memory: no     Cognitive Screening: Normal - Verbal Fluency Test    Assessment/Plan   Education and counseling provided:  Are appropriate based on today's review and evaluation    Diagnoses and all orders for this visit:    1. Medicare annual wellness visit, subsequent    2.  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    Spinal stenosis of lumbosacral region M48.07    Adult hypothyroidism E03.9    Family history of colon cancer Z80.0    HTN (hypertension), benign I10    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     Past Medical History:   Diagnosis Date    Allergic rhinitis     Arthritis     spinal stenosis    Family history of colon cancer 2/12/2013    Hemorrhoids     Leg cramps     Other ill-defined conditions(799.89) 2010    multiple cortisone injections to back    Persistent disorder of initiating or maintaining sleep     Spinal stenosis 1/21/2011    Thyroid disease     borderline      Past Surgical History:   Procedure Laterality Date    HX CYST INCISION AND DRAINAGE Right     yrs ago    HX REFRACTIVE SURGERY      HX TONSIL AND ADENOIDECTOMY      NE COLONOSCOPY FLX DX W/COLLJ SPEC WHEN PFRMD  11/30/2010          Current Outpatient Medications   Medication Sig Dispense Refill    olopatadine (PATADAY) 0.2 % drop ophthalmic solution Administer 1 Drop to both eyes daily for 90 days. 52 mL 2    omeprazole (PRILOSEC) 20 mg capsule Take 1 Cap by mouth daily. 90 Cap 1    losartan-hydroCHLOROthiazide (HYZAAR) 100-25 mg per tablet Take 1 Tab by mouth daily. 90 Tab 1    atorvastatin (LIPITOR) 20 mg tablet TAKE 1 TABLET EVERY DAY 90 Tab 3    levothyroxine (SYNTHROID) 50 mcg tablet TAKE 1 TABLET EVERY DAY BEFORE BREAKFAST 90 Tab 3    zolpidem (AMBIEN) 5 mg tablet Take 1 Tab by mouth nightly as needed. 30 Tab 0    selenium sulfide (SELSUN) 2.5 % shampoo As directed 120 mL 2    naproxen sodium (Aleve) 220 mg cap Take 440 mg by mouth two (2) times a day.  loratadine (CLARITIN) 10 mg tablet Take 10 mg by mouth daily.        Allergies   Allergen Reactions    Other Medication Anaphylaxis     Allergic to lobster (specific)    Ace Inhibitors Cough       Family History   Problem Relation Age of Onset    Heart Disease Mother     Asthma Mother     Hypertension Mother     Cancer Father         colon    Hypertension Father     Thyroid Disease Sister     Heart Disease Brother     Hypertension Brother     Stroke Brother      Social History     Tobacco Use    Smoking status: Former Smoker     Quit date: 1969     Years since quittin.0    Smokeless tobacco: Never Used    Tobacco comment: quit years ago   Substance Use Topics    Alcohol use: Yes     Alcohol/week: 2.5 standard drinks     Types: 3 Glasses of wine per week         Subjective:  69-year-old female here for follow up of chronic problems. Has hypertension and dyslipidemia. Review of systems is negative for cardiovascular or neurological symptoms, no intermittent claudication. She has hypothyroidism, on a standard dose of Levothyroxine, is very compliant and has had no troubles. She was referred to pain management because of spondyloarthropathy and radicular pain down her legs. She has had a couple of epidurals and has improved to some extent. She has a few symptoms, but not too bad. She is going back for a follow up visit after the first of the year and will make further decisions. She has been active and doing things, been playing some pickleball and generally is feeling okay. She has a family history of hypertension, family history of heart disease and her brother had a prior stroke. Vitals:    20 0858 20 0934   BP: (!) 174/76 (!) 145/78   Pulse: (!) 57    Resp: 18    Temp: 97.7 °F (36.5 °C)    TempSrc: Temporal    SpO2: 98%    Weight: 151 lb 3.2 oz (68.6 kg)    Height: 5' 2\" (1.575 m)      Home /80    S1 and S2 normal, no murmurs, clicks, gallops or rubs. Regular rate and rhythm. Chest is clear; no wheezes or rales. No edema or JVD. Thyroid not palpable, not enlarged, no nodules detected. Neuro normal  Normal gait      1.  Medicare annual wellness visit, subsequent  Reviewed  Will coordinate care with all the physicians and providers      2. Screening for depression  no  - DEPRESSION SCREEN ANNUAL    3. Adult hypothyroidism  Stable labs now  - TSH 3RD GENERATION; Future    4. HTN (hypertension), benign  Control ok at home  - CBC WITH AUTOMATED DIFF; Future  - LIPID PANEL; Future  - METABOLIC PANEL, COMPREHENSIVE; Future    5. Spondylo-arthropathy  better    6.  Dyslipidemia  labs

## 2020-12-17 NOTE — PROGRESS NOTES
Chief Complaint   Patient presents with    Complete Physical             1. Have you been to the ER, urgent care clinic since your last visit? Hospitalized since your last visit? No    2. Have you seen or consulted any other health care providers outside of the [address] [place] [address] since your last visit? Include any pap smears or colon screening.  No

## 2020-12-17 NOTE — PATIENT INSTRUCTIONS
Medicare Wellness Visit, Female The best way to live healthy is to have a lifestyle where you eat a well-balanced diet, exercise regularly, limit alcohol use, and quit all forms of tobacco/nicotine, if applicable. Regular preventive services are another way to keep healthy. Preventive services (vaccines, screening tests, monitoring & exams) can help personalize your care plan, which helps you manage your own care. Screening tests can find health problems at the earliest stages, when they are easiest to treat. [facility] follows the current, evidence-based guidelines published by the [facility] ([email or web address]ST[facility]) when recommending preventive services for our patients. Because we follow these guidelines, sometimes recommendations change over time as research supports it. (For example, mammograms used to be recommended annually. Even though Medicare will still pay for an annual mammogram, the newer guidelines recommend a mammogram every two years for women of average risk). Of course, you and your doctor may decide to screen more often for some diseases, based on your risk and your co-morbidities (chronic disease you are already diagnosed with). Preventive services for you include: - Medicare offers their members a free annual wellness visit, which is time for you and your primary care provider to discuss and plan for your preventive service needs. Take advantage of this benefit every year! 
-All adults over the age of 72 should receive the recommended pneumonia vaccines. Current USPSTF guidelines recommend a series of two vaccines for the best pneumonia protection.  
-All adults should have a flu vaccine yearly and a tetanus vaccine every 10 years.  
-All adults age 48 and older should receive the shingles vaccines (series of two vaccines). -All adults age 38-68 who are overweight should have a diabetes screening test once every three years. -All adults born between 80 and 1965 should be screened once for Hepatitis C. 
-Other screening tests and preventive services for persons with diabetes include: an eye exam to screen for diabetic retinopathy, a kidney function test, a foot exam, and stricter control over your cholesterol.  
-Cardiovascular screening for adults with routine risk involves an electrocardiogram (ECG) at intervals determined by your doctor.  
-Colorectal cancer screenings should be done for adults age 54-65 with no increased risk factors for colorectal cancer. There are a number of acceptable methods of screening for this type of cancer. Each test has its own benefits and drawbacks. Discuss with your doctor what is most appropriate for you during your annual wellness visit. The different tests include: colonoscopy (considered the best screening method), a fecal occult blood test, a fecal DNA test, and sigmoidoscopy. 
 
-A bone mass density test is recommended when a woman turns 65 to screen for osteoporosis. This test is only recommended one time, as a screening. Some providers will use this same test as a disease monitoring tool if you already have osteoporosis. -Breast cancer screenings are recommended every other year for women of normal risk, age 54-69. 
-Cervical cancer screenings for women over age 72 are only recommended with certain risk factors. Here is a list of your current Health Maintenance items (your personalized list of preventive services) with a due date: 
Health Maintenance Due Topic Date Due  Glaucoma Screening   10/17/2020  
 DTaP/Tdap/Td  (2 - Td) 11/12/2020  Cholesterol Test   12/04/2020

## 2021-01-30 ENCOUNTER — PATIENT MESSAGE (OUTPATIENT)
Dept: INTERNAL MEDICINE CLINIC | Age: 81
End: 2021-01-30

## 2021-01-30 DIAGNOSIS — R73.01 ABNORMAL FASTING GLUCOSE: Primary | ICD-10-CM

## 2021-01-30 NOTE — TELEPHONE ENCOUNTER
From: Brennen Cardenas MD  To: Nathalia Birmingham  Sent: 1/30/2021 8:34 AM EST  Subject: labs    Was this fasting? ? As glucose higher than before? ?    Let me know

## 2021-02-02 DIAGNOSIS — C61 PROSTATE CANCER (HCC): ICD-10-CM

## 2021-02-04 LAB
EST. AVERAGE GLUCOSE BLD GHB EST-MCNC: 111 MG/DL
GLUCOSE SERPL-MCNC: 102 MG/DL (ref 65–100)
HBA1C MFR BLD: 5.5 % (ref 4–5.6)

## 2021-03-10 DIAGNOSIS — Z79.1 NSAID LONG-TERM USE: ICD-10-CM

## 2021-03-10 RX ORDER — OMEPRAZOLE 20 MG/1
CAPSULE, DELAYED RELEASE ORAL
Qty: 90 CAP | Refills: 1 | Status: SHIPPED | OUTPATIENT
Start: 2021-03-10 | End: 2021-11-05

## 2021-03-10 RX ORDER — LOSARTAN POTASSIUM AND HYDROCHLOROTHIAZIDE 25; 100 MG/1; MG/1
TABLET ORAL
Qty: 90 TAB | Refills: 1 | Status: SHIPPED | OUTPATIENT
Start: 2021-03-10 | End: 2021-08-30

## 2021-04-11 ENCOUNTER — PATIENT MESSAGE (OUTPATIENT)
Dept: INTERNAL MEDICINE CLINIC | Age: 81
End: 2021-04-11

## 2021-04-11 DIAGNOSIS — Z12.83 SKIN EXAM FOR MALIGNANT NEOPLASM: Primary | ICD-10-CM

## 2021-04-12 NOTE — TELEPHONE ENCOUNTER
From: Inocente Gonzalez  To: Viviana Esparza MD  Sent: 4/11/2021 1:49 PM EDT  Subject: Referral Request    I have an appointment in Tuesday, April 13 with Deanna Byrnes at [facility] D[facility]ology for a check up. I need a referral Thanks so much.  Paula Hopper

## 2021-06-13 RX ORDER — ATORVASTATIN CALCIUM 20 MG/1
TABLET, FILM COATED ORAL
Qty: 90 TABLET | Refills: 3 | Status: SHIPPED | OUTPATIENT
Start: 2021-06-13 | End: 2022-02-08

## 2021-08-30 RX ORDER — LOSARTAN POTASSIUM AND HYDROCHLOROTHIAZIDE 25; 100 MG/1; MG/1
TABLET ORAL
Qty: 90 TABLET | Refills: 1 | Status: SHIPPED | OUTPATIENT
Start: 2021-08-30 | End: 2022-05-17

## 2021-11-05 DIAGNOSIS — Z79.1 NSAID LONG-TERM USE: ICD-10-CM

## 2021-11-05 RX ORDER — OMEPRAZOLE 20 MG/1
CAPSULE, DELAYED RELEASE ORAL
Qty: 90 CAPSULE | Refills: 1 | Status: SHIPPED | OUTPATIENT
Start: 2021-11-05 | End: 2022-04-24

## 2021-11-05 RX ORDER — LEVOTHYROXINE SODIUM 50 UG/1
TABLET ORAL
Qty: 90 TABLET | Refills: 0 | Status: SHIPPED | OUTPATIENT
Start: 2021-11-05 | End: 2022-02-08

## 2021-11-30 DIAGNOSIS — Z79.1 NSAID LONG-TERM USE: ICD-10-CM

## 2021-11-30 RX ORDER — OMEPRAZOLE 20 MG/1
20 CAPSULE, DELAYED RELEASE ORAL DAILY
Qty: 90 CAPSULE | Refills: 1 | Status: CANCELLED | OUTPATIENT
Start: 2021-11-30

## 2021-12-08 ENCOUNTER — OFFICE VISIT (OUTPATIENT)
Dept: INTERNAL MEDICINE CLINIC | Age: 81
End: 2021-12-08
Payer: MEDICARE

## 2021-12-08 VITALS
TEMPERATURE: 98 F | HEIGHT: 62 IN | RESPIRATION RATE: 18 BRPM | WEIGHT: 145 LBS | OXYGEN SATURATION: 94 % | BODY MASS INDEX: 26.68 KG/M2 | HEART RATE: 64 BPM | DIASTOLIC BLOOD PRESSURE: 57 MMHG | SYSTOLIC BLOOD PRESSURE: 126 MMHG

## 2021-12-08 DIAGNOSIS — Z00.00 MEDICARE ANNUAL WELLNESS VISIT, SUBSEQUENT: ICD-10-CM

## 2021-12-08 DIAGNOSIS — E03.9 ADULT HYPOTHYROIDISM: ICD-10-CM

## 2021-12-08 DIAGNOSIS — G47.00 PERSISTENT DISORDER OF INITIATING OR MAINTAINING SLEEP: ICD-10-CM

## 2021-12-08 DIAGNOSIS — E78.5 DYSLIPIDEMIA: ICD-10-CM

## 2021-12-08 DIAGNOSIS — I10 HTN (HYPERTENSION), BENIGN: ICD-10-CM

## 2021-12-08 DIAGNOSIS — R73.01 ABNORMAL FASTING GLUCOSE: Primary | ICD-10-CM

## 2021-12-08 PROBLEM — C61 PROSTATE CANCER (HCC): Status: RESOLVED | Noted: 2021-02-02 | Resolved: 2021-12-08

## 2021-12-08 PROCEDURE — 1100F PTFALLS ASSESS-DOCD GE2>/YR: CPT | Performed by: INTERNAL MEDICINE

## 2021-12-08 PROCEDURE — G8427 DOCREV CUR MEDS BY ELIG CLIN: HCPCS | Performed by: INTERNAL MEDICINE

## 2021-12-08 PROCEDURE — G8536 NO DOC ELDER MAL SCRN: HCPCS | Performed by: INTERNAL MEDICINE

## 2021-12-08 PROCEDURE — G8399 PT W/DXA RESULTS DOCUMENT: HCPCS | Performed by: INTERNAL MEDICINE

## 2021-12-08 PROCEDURE — 1090F PRES/ABSN URINE INCON ASSESS: CPT | Performed by: INTERNAL MEDICINE

## 2021-12-08 PROCEDURE — 99214 OFFICE O/P EST MOD 30 MIN: CPT | Performed by: INTERNAL MEDICINE

## 2021-12-08 PROCEDURE — G8752 SYS BP LESS 140: HCPCS | Performed by: INTERNAL MEDICINE

## 2021-12-08 PROCEDURE — 3288F FALL RISK ASSESSMENT DOCD: CPT | Performed by: INTERNAL MEDICINE

## 2021-12-08 PROCEDURE — G0439 PPPS, SUBSEQ VISIT: HCPCS | Performed by: INTERNAL MEDICINE

## 2021-12-08 PROCEDURE — G8419 CALC BMI OUT NRM PARAM NOF/U: HCPCS | Performed by: INTERNAL MEDICINE

## 2021-12-08 PROCEDURE — G8432 DEP SCR NOT DOC, RNG: HCPCS | Performed by: INTERNAL MEDICINE

## 2021-12-08 PROCEDURE — G8754 DIAS BP LESS 90: HCPCS | Performed by: INTERNAL MEDICINE

## 2021-12-08 RX ORDER — ZOLPIDEM TARTRATE 5 MG/1
5 TABLET ORAL
Qty: 30 TABLET | Refills: 0 | Status: SHIPPED | OUTPATIENT
Start: 2021-12-08 | End: 2022-05-20 | Stop reason: SDUPTHER

## 2021-12-08 NOTE — PROGRESS NOTES
Chief Complaint   Patient presents with    Follow-up     1. Have you been to the ER, urgent care clinic since your last visit? Hospitalized since your last visit? No    2. Have you seen or consulted any other health care providers outside of the [address] [place] [address] since your last visit? Include any pap smears or colon screening.  No.     Visit Vitals  BP (!) 126/57   Pulse 64   Temp 98 °F (36.7 °C) (Temporal)   Resp 18   Ht 5' 2\" (1.575 m)   Wt 145 lb (65.8 kg)   SpO2 94%   BMI 26.52 kg/m²

## 2021-12-09 NOTE — PATIENT INSTRUCTIONS
Medicare Wellness Visit, Female     The best way to live healthy is to have a lifestyle where you eat a well-balanced diet, exercise regularly, limit alcohol use, and quit all forms of tobacco/nicotine, if applicable. Regular preventive services are another way to keep healthy. Preventive services (vaccines, screening tests, monitoring & exams) can help personalize your care plan, which helps you manage your own care. Screening tests can find health problems at the earliest stages, when they are easiest to treat. [facility] follows the current, evidence-based guidelines published by the [facility] ([facility]) when recommending preventive services for our patients. Because we follow these guidelines, sometimes recommendations change over time as research supports it. (For example, mammograms used to be recommended annually. Even though Medicare will still pay for an annual mammogram, the newer guidelines recommend a mammogram every two years for women of average risk). Of course, you and your doctor may decide to screen more often for some diseases, based on your risk and your co-morbidities (chronic disease you are already diagnosed with). Preventive services for you include:  - Medicare offers their members a free annual wellness visit, which is time for you and your primary care provider to discuss and plan for your preventive service needs. Take advantage of this benefit every year!  -All adults over the age of 72 should receive the recommended pneumonia vaccines. Current USPSTF guidelines recommend a series of two vaccines for the best pneumonia protection.   -All adults should have a flu vaccine yearly and a tetanus vaccine every 10 years.   -All adults age 48 and older should receive the shingles vaccines (series of two vaccines).       -All adults age 38-68 who are overweight should have a diabetes screening test once every three years.   -All adults born between 80 and 1965 should be screened once for Hepatitis C.  -Other screening tests and preventive services for persons with diabetes include: an eye exam to screen for diabetic retinopathy, a kidney function test, a foot exam, and stricter control over your cholesterol.   -Cardiovascular screening for adults with routine risk involves an electrocardiogram (ECG) at intervals determined by your doctor.   -Colorectal cancer screenings should be done for adults age 54-65 with no increased risk factors for colorectal cancer. There are a number of acceptable methods of screening for this type of cancer. Each test has its own benefits and drawbacks. Discuss with your doctor what is most appropriate for you during your annual wellness visit. The different tests include: colonoscopy (considered the best screening method), a fecal occult blood test, a fecal DNA test, and sigmoidoscopy.    -A bone mass density test is recommended when a woman turns 65 to screen for osteoporosis. This test is only recommended one time, as a screening. Some providers will use this same test as a disease monitoring tool if you already have osteoporosis. -Breast cancer screenings are recommended every other year for women of normal risk, age 54-69.  -Cervical cancer screenings for women over age 72 are only recommended with certain risk factors.      Here is a list of your current Health Maintenance items (your personalized list of preventive services) with a due date:  Health Maintenance Due   Topic Date Due    DTaP/Tdap/Td  (2 - Td or Tdap) 11/12/2020    Yearly Flu Vaccine (1) 09/01/2021

## 2021-12-09 NOTE — PROGRESS NOTES
This is the Subsequent Medicare Annual Wellness Exam, performed 12 months or more after the Initial AWV or the last Subsequent AWV    I have reviewed the patient's medical history in detail and updated the computerized patient record. Assessment/Plan   Education and counseling provided:  Are appropriate based on today's review and evaluation  End-of-Life planning (with patient's consent)  Pneumococcal Vaccine  Influenza Vaccine    1. Abnormal fasting glucose  2. Persistent disorder of initiating or maintaining sleep  -     zolpidem (Ambien) 5 mg tablet; Take 1 Tablet by mouth nightly as needed for Sleep., Normal, Disp-30 Tablet, R-0  3. HTN (hypertension), benign  -     zolpidem (Ambien) 5 mg tablet; Take 1 Tablet by mouth nightly as needed for Sleep., Normal, Disp-30 Tablet, R-0  -     MICROALBUMIN, UR, RAND W/ MICROALB/CREAT RATIO; Future  -     CBC WITH AUTOMATED DIFF; Future  -     METABOLIC PANEL, COMPREHENSIVE; Future  4. Dyslipidemia  -     LIPID PANEL; Future  5. Adult hypothyroidism  -     TSH 3RD GENERATION; Future  6. Medicare annual wellness visit, subsequent       Depression Risk Factor Screening     3 most recent PHQ Screens 9/9/2021   Little interest or pleasure in doing things Not at all   Feeling down, depressed, irritable, or hopeless Not at all   Total Score PHQ 2 0       Alcohol Risk Screen    Do you average more than 1 drink per night or more than 7 drinks a week:  No    On any one occasion in the past three months have you have had more than 3 drinks containing alcohol:  No        Functional Ability and Level of Safety    Hearing: Hearing is good. Activities of Daily Living: The home contains: no safety equipment. Patient does total self care      Ambulation: with no difficulty     Fall Risk:  Fall Risk Assessment, last 12 mths 12/17/2020   Able to walk? Yes   Fall in past 12 months? Yes   Number of falls in past 12 months 1   Fall with injury?  0      Abuse Screen:  Patient is not abused       Cognitive Screening    Has your family/caregiver stated any concerns about your memory: no     Cognitive Screening: Normal - Verbal Fluency Test    Health Maintenance Due     Health Maintenance Due   Topic Date Due    DTaP/Tdap/Td series (2 - Td or Tdap) 11/12/2020    Flu Vaccine (1) 09/01/2021       Patient Care Team   Patient Care Team:  Verito Ceron MD as PCP - General (Internal Medicine)  Verito Ceron MD as PCP - REHABILITATION [facility] Empaneled Provider    History     Patient Active Problem List   Diagnosis Code    Allergic rhinitis 80    Leg cramps R25.2    Hemorrhoids 455    Persistent disorder of initiating or maintaining sleep G47.00    Spinal stenosis of lumbosacral region M48.07    Adult hypothyroidism E03.9    Family history of colon cancer Z80.0    HTN (hypertension), benign I10    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     Past Medical History:   Diagnosis Date    Allergic rhinitis     Arthritis     spinal stenosis    Family history of colon cancer 2/12/2013    Hemorrhoids     Leg cramps     Other ill-defined conditions(799.89) 2010    multiple cortisone injections to back    Persistent disorder of initiating or maintaining sleep     Spinal stenosis 1/21/2011    Thyroid disease     borderline      Past Surgical History:   Procedure Laterality Date    HX CYST INCISION AND DRAINAGE Right     yrs ago    HX REFRACTIVE SURGERY      HX TONSIL AND ADENOIDECTOMY      NE COLONOSCOPY FLX DX W/COLLJ SPEC WHEN PFRMD  11/30/2010          Current Outpatient Medications   Medication Sig Dispense Refill    zolpidem (Ambien) 5 mg tablet Take 1 Tablet by mouth nightly as needed for Sleep.  30 Tablet 0    omeprazole (PRILOSEC) 20 mg capsule TAKE 1 CAPSULE EVERY DAY 90 Capsule 1    levothyroxine (SYNTHROID) 50 mcg tablet TAKE 1 TABLET EVERY DAY BEFORE BREAKFAST 90 Tablet 0    olopatadine (PATADAY) 0.2 % drop ophthalmic solution       losartan-hydroCHLOROthiazide (HYZAAR) 100-25 mg per tablet TAKE 1 TABLET EVERY DAY 90 Tablet 1    atorvastatin (LIPITOR) 20 mg tablet TAKE 1 TABLET EVERY DAY 90 Tablet 3    naproxen sodium (Aleve) 220 mg cap Take 440 mg by mouth two (2) times a day. (Patient not taking: Reported on 2021)      selenium sulfide (SELSUN) 2.5 % shampoo As directed (Patient taking differently: daily as needed. As directed) 120 mL 2    loratadine (CLARITIN) 10 mg tablet Take 10 mg by mouth daily as needed. Allergies   Allergen Reactions    Other Medication Anaphylaxis     Allergic to lobster (specific)    Ace Inhibitors Cough       Family History   Problem Relation Age of Onset    Heart Disease Mother     Asthma Mother     Hypertension Mother     Cancer Father         colon    Hypertension Father     Thyroid Disease Sister     Heart Disease Brother     Hypertension Brother     Stroke Brother      Social History     Tobacco Use    Smoking status: Former Smoker     Packs/day: 0.10     Years: 2.50     Pack years: 0.25     Quit date: 1969     Years since quittin.0    Smokeless tobacco: Never Used    Tobacco comment: quit years ago   Substance Use Topics    Alcohol use: Yes     Alcohol/week: 2.5 standard drinks     Types: 3 Glasses of wine per week     An 81-y. o. with reflux with hypothyroidism with mild hypertension, dyslipidemia and doing quite nicely. Exercises a lot. Eats a healthy diet. Maintains her weight. Alcohol intake has been minor. She denies chest pain, shortness of breath, PND, orthopnea. Denies any thyroid symptoms. No skin changes. No palpitations. No hair loss. She has intermittent claudication.       Cardiovascular ROS: no chest pain or dyspnea on exertion  Neurological ROS: no TIA or stroke symptoms  General ROS: negative for - chills, fatigue, fever, malaise, night sweats or sleep disturbance  Endocrine ROS: negative for - hair pattern changes, hot flashes, malaise/lethargy, palpitations, polydipsia/polyuria, temperature intolerance or unexpected weight changes    Vitals:    12/08/21 1642   BP: (!) 126/57   Pulse: 64   Resp: 18   Temp: 98 °F (36.7 °C)   TempSrc: Temporal   SpO2: 94%   Weight: 145 lb (65.8 kg)   Height: 5' 2\" (1.575 m)     Wt Readings from Last 3 Encounters:   12/08/21 145 lb (65.8 kg)   12/17/20 151 lb 3.2 oz (68.6 kg)   09/17/20 151 lb (68.5 kg)   '  S1 and S2 normal, no murmurs, clicks, gallops or rubs. Regular rate and rhythm. Chest is clear; no wheezes or rales. No edema or JVD. Neuro normal  Thyroid not enlarged  Skin not dry      1. Persistent disorder of initiating or maintaining sleep  Refill for 2 years  - zolpidem (Ambien) 5 mg tablet; Take 1 Tablet by mouth nightly as needed for Sleep. Dispense: 30 Tablet; Refill: 0    2. Abnormal fasting glucose  Repeat fasting    3. HTN (hypertension), benign  controlled  - zolpidem (Ambien) 5 mg tablet; Take 1 Tablet by mouth nightly as needed for Sleep. Dispense: 30 Tablet; Refill: 0  - MICROALBUMIN, UR, RAND W/ MICROALB/CREAT RATIO; Future  - CBC WITH AUTOMATED DIFF; Future  - METABOLIC PANEL, COMPREHENSIVE; Future  - MICROALBUMIN, UR, RAND W/ MICROALB/CREAT RATIO  - CBC WITH AUTOMATED DIFF  - METABOLIC PANEL, COMPREHENSIVE    4. Dyslipidemia  follow  - LIPID PANEL; Future  - LIPID PANEL    5. Adult hypothyroidism  labs  - TSH 3RD GENERATION; Future  - TSH 3RD GENERATION    6.  Medicare annual wellness visit, subsequent  Will coordinate care with all the physicians and providers  Seeing DR long for injections at times    Ada Bentley MD

## 2021-12-14 LAB
ALBUMIN SERPL-MCNC: 4.6 G/DL (ref 3.7–4.7)
ALBUMIN/CREAT UR: <11 MG/G CREAT (ref 0–29)
ALBUMIN/GLOB SERPL: 2.2 {RATIO} (ref 1.2–2.2)
ALP SERPL-CCNC: 86 IU/L (ref 44–121)
ALT SERPL-CCNC: 18 IU/L (ref 0–32)
AST SERPL-CCNC: 22 IU/L (ref 0–40)
BASOPHILS # BLD AUTO: 0.1 X10E3/UL (ref 0–0.2)
BASOPHILS NFR BLD AUTO: 1 %
BILIRUB SERPL-MCNC: 0.3 MG/DL (ref 0–1.2)
BUN SERPL-MCNC: 17 MG/DL (ref 8–27)
BUN/CREAT SERPL: 18 (ref 12–28)
CALCIUM SERPL-MCNC: 9.5 MG/DL (ref 8.7–10.3)
CHLORIDE SERPL-SCNC: 97 MMOL/L (ref 96–106)
CHOLEST SERPL-MCNC: 184 MG/DL (ref 100–199)
CO2 SERPL-SCNC: 24 MMOL/L (ref 20–29)
CREAT SERPL-MCNC: 0.93 MG/DL (ref 0.57–1)
CREAT UR-MCNC: 27.4 MG/DL
EOSINOPHIL # BLD AUTO: 0.3 X10E3/UL (ref 0–0.4)
EOSINOPHIL NFR BLD AUTO: 4 %
ERYTHROCYTE [DISTWIDTH] IN BLOOD BY AUTOMATED COUNT: 12.2 % (ref 11.7–15.4)
GLOBULIN SER CALC-MCNC: 2.1 G/DL (ref 1.5–4.5)
GLUCOSE SERPL-MCNC: 102 MG/DL (ref 65–99)
HCT VFR BLD AUTO: 36.7 % (ref 34–46.6)
HDLC SERPL-MCNC: 87 MG/DL
HGB BLD-MCNC: 12.1 G/DL (ref 11.1–15.9)
IMM GRANULOCYTES # BLD AUTO: 0 X10E3/UL (ref 0–0.1)
IMM GRANULOCYTES NFR BLD AUTO: 0 %
IMP & REVIEW OF LAB RESULTS: NORMAL
INTERPRETATION: NORMAL
LDLC SERPL CALC-MCNC: 86 MG/DL (ref 0–99)
LYMPHOCYTES # BLD AUTO: 2.3 X10E3/UL (ref 0.7–3.1)
LYMPHOCYTES NFR BLD AUTO: 25 %
MCH RBC QN AUTO: 28.9 PG (ref 26.6–33)
MCHC RBC AUTO-ENTMCNC: 33 G/DL (ref 31.5–35.7)
MCV RBC AUTO: 88 FL (ref 79–97)
MICROALBUMIN UR-MCNC: <3 UG/ML
MONOCYTES # BLD AUTO: 0.6 X10E3/UL (ref 0.1–0.9)
MONOCYTES NFR BLD AUTO: 7 %
NEUTROPHILS # BLD AUTO: 6 X10E3/UL (ref 1.4–7)
NEUTROPHILS NFR BLD AUTO: 63 %
PLATELET # BLD AUTO: 339 X10E3/UL (ref 150–450)
POTASSIUM SERPL-SCNC: 3.9 MMOL/L (ref 3.5–5.2)
PROT SERPL-MCNC: 6.7 G/DL (ref 6–8.5)
RBC # BLD AUTO: 4.18 X10E6/UL (ref 3.77–5.28)
SODIUM SERPL-SCNC: 138 MMOL/L (ref 134–144)
TRIGL SERPL-MCNC: 54 MG/DL (ref 0–149)
TSH SERPL DL<=0.005 MIU/L-ACNC: 2.79 UIU/ML (ref 0.45–4.5)
VLDLC SERPL CALC-MCNC: 11 MG/DL (ref 5–40)
WBC # BLD AUTO: 9.3 X10E3/UL (ref 3.4–10.8)

## 2022-02-08 RX ORDER — ATORVASTATIN CALCIUM 20 MG/1
TABLET, FILM COATED ORAL
Qty: 90 TABLET | Refills: 3 | Status: SHIPPED | OUTPATIENT
Start: 2022-02-08

## 2022-03-20 PROBLEM — M47.819 SPONDYLO-ARTHROPATHY: Status: ACTIVE | Noted: 2020-09-17

## 2022-04-23 DIAGNOSIS — Z79.1 NSAID LONG-TERM USE: ICD-10-CM

## 2022-04-24 RX ORDER — OMEPRAZOLE 20 MG/1
CAPSULE, DELAYED RELEASE ORAL
Qty: 90 CAPSULE | Refills: 1 | Status: SHIPPED | OUTPATIENT
Start: 2022-04-24

## 2022-05-17 RX ORDER — LOSARTAN POTASSIUM AND HYDROCHLOROTHIAZIDE 25; 100 MG/1; MG/1
TABLET ORAL
Qty: 90 TABLET | Refills: 1 | Status: SHIPPED | OUTPATIENT
Start: 2022-05-17

## 2022-05-20 DIAGNOSIS — I10 HTN (HYPERTENSION), BENIGN: ICD-10-CM

## 2022-05-20 DIAGNOSIS — G47.00 PERSISTENT DISORDER OF INITIATING OR MAINTAINING SLEEP: ICD-10-CM

## 2022-05-21 NOTE — TELEPHONE ENCOUNTER
Future Appointments:  Future Appointments   Date Time Provider Gabriel Bryanti   12/8/2022  9:00 AM Luz Orozco MD [facility]        Last Appointment With Me:  12/8/2021     Requested Prescriptions     Pending Prescriptions Disp Refills    zolpidem (Ambien) 5 mg tablet 30 Tablet 0     Sig: Take 1 Tablet by mouth nightly as needed for Sleep.

## 2022-05-26 NOTE — TELEPHONE ENCOUNTER
Future Appointments:  Future Appointments   Date Time Provider Gabriel Rasmussen   12/8/2022  9:00 AM Soha Orozco MD [facility]        Last Appointment With Me:  12/8/2021     Requested Prescriptions     Pending Prescriptions Disp Refills    zolpidem (Ambien) 5 mg tablet 30 Tablet 0     Sig: Take 1 Tablet by mouth nightly as needed for Sleep.

## 2022-05-27 RX ORDER — ZOLPIDEM TARTRATE 5 MG/1
5 TABLET ORAL
Qty: 30 TABLET | Refills: 0 | Status: SHIPPED | OUTPATIENT
Start: 2022-05-27

## 2022-06-23 ENCOUNTER — NURSE TRIAGE (OUTPATIENT)
Dept: OTHER | Facility: CLINIC | Age: 82
End: 2022-06-23

## 2022-06-23 NOTE — TELEPHONE ENCOUNTER
Received call from [address] at [facility] with Red Flag Complaint. Current Symptoms: While in PT this morning, the pt started to experience intermittent dizziness and nausea. She feels okay when she is standing, sitting and walking. Symptoms occur when she is laying down. Pt believes that she is experiencing vertigo. BP is currently 153/94 and HR is currently 66 bpm. She has not yet taken her BP medication today. Pt states that she feels okay right now because she is sitting down. Onset: 30 minutes ago    Pain Severity: \"Dull headache\", 2 out of 10    Temperature: Denies fever    What has been tried: Ate a piece of toast     Pregnant: NA    Recommended disposition: Pt would like to be seen today. Care advice provided, patient verbalizes understanding; denies any other questions or concerns; instructed to call back for any new or worsening symptoms. Patient/Caller agrees with recommended disposition; writer provided warm transfer to [facility] for appointment scheduling    Attention Provider: Thank you for allowing me to participate in the care of your patient. The patient was connected to triage in response to information provided to the [facility]. Please do not respond through this encounter as the response is not directed to a shared pool.     Reason for Disposition   Patient wants to be seen    Protocols used: [id number]

## 2022-06-24 ENCOUNTER — OFFICE VISIT (OUTPATIENT)
Dept: INTERNAL MEDICINE CLINIC | Age: 82
End: 2022-06-24
Payer: MEDICARE

## 2022-06-24 VITALS
WEIGHT: 147.8 LBS | TEMPERATURE: 98.5 F | RESPIRATION RATE: 18 BRPM | HEIGHT: 62 IN | SYSTOLIC BLOOD PRESSURE: 143 MMHG | DIASTOLIC BLOOD PRESSURE: 73 MMHG | HEART RATE: 68 BPM | OXYGEN SATURATION: 99 % | BODY MASS INDEX: 27.2 KG/M2

## 2022-06-24 DIAGNOSIS — H61.21 IMPACTED CERUMEN OF RIGHT EAR: ICD-10-CM

## 2022-06-24 DIAGNOSIS — R42 DIZZINESS: ICD-10-CM

## 2022-06-24 DIAGNOSIS — H81.10 BENIGN PAROXYSMAL POSITIONAL VERTIGO, UNSPECIFIED LATERALITY: Primary | ICD-10-CM

## 2022-06-24 LAB
ANION GAP SERPL CALC-SCNC: 5 MMOL/L (ref 5–15)
BUN SERPL-MCNC: 18 MG/DL (ref 6–20)
BUN/CREAT SERPL: 20 (ref 12–20)
CALCIUM SERPL-MCNC: 9.7 MG/DL (ref 8.5–10.1)
CHLORIDE SERPL-SCNC: 104 MMOL/L (ref 97–108)
CO2 SERPL-SCNC: 30 MMOL/L (ref 21–32)
CREAT SERPL-MCNC: 0.89 MG/DL (ref 0.55–1.02)
GLUCOSE SERPL-MCNC: 110 MG/DL (ref 65–100)
POTASSIUM SERPL-SCNC: 4.7 MMOL/L (ref 3.5–5.1)
SODIUM SERPL-SCNC: 139 MMOL/L (ref 136–145)

## 2022-06-24 PROCEDURE — G8536 NO DOC ELDER MAL SCRN: HCPCS | Performed by: INTERNAL MEDICINE

## 2022-06-24 PROCEDURE — G8432 DEP SCR NOT DOC, RNG: HCPCS | Performed by: INTERNAL MEDICINE

## 2022-06-24 PROCEDURE — 1090F PRES/ABSN URINE INCON ASSESS: CPT | Performed by: INTERNAL MEDICINE

## 2022-06-24 PROCEDURE — G8753 SYS BP > OR = 140: HCPCS | Performed by: INTERNAL MEDICINE

## 2022-06-24 PROCEDURE — G8399 PT W/DXA RESULTS DOCUMENT: HCPCS | Performed by: INTERNAL MEDICINE

## 2022-06-24 PROCEDURE — 1101F PT FALLS ASSESS-DOCD LE1/YR: CPT | Performed by: INTERNAL MEDICINE

## 2022-06-24 PROCEDURE — G8417 CALC BMI ABV UP PARAM F/U: HCPCS | Performed by: INTERNAL MEDICINE

## 2022-06-24 PROCEDURE — G8427 DOCREV CUR MEDS BY ELIG CLIN: HCPCS | Performed by: INTERNAL MEDICINE

## 2022-06-24 PROCEDURE — 99213 OFFICE O/P EST LOW 20 MIN: CPT | Performed by: INTERNAL MEDICINE

## 2022-06-24 PROCEDURE — G8754 DIAS BP LESS 90: HCPCS | Performed by: INTERNAL MEDICINE

## 2022-06-24 NOTE — PROGRESS NOTES
Chief Complaint   Patient presents with    Dizziness     Patient is here today cc.o dizziness. Patient states yesterday morning she woke up dizzy, reports the room was spinning. After exercising yesterday pt report feeling nauseous. Vitals:    06/24/22 0837   BP: (!) 143/73   Pulse: 68   Resp: 18   Temp: 98.5 °F (36.9 °C)   TempSrc: Temporal   SpO2: 99%   Weight: 147 lb 12.8 oz (67 kg)   Height: 5' 2\" (1.575 m)   PainSc:   0 - No pain       Health Maintenance Due   Topic    DTaP/Tdap/Td series (2 - Td or Tdap)       1. \"Have you been to the ER, urgent care clinic since your last visit? Hospitalized since your last visit? \" No    2. \"Have you seen or consulted any other health care providers outside of the [address] [place] [address] since your last visit? \" No     3. For patients aged 39-70: Has the patient had a colonoscopy / FIT/ Cologuard? Yes - no Care Gap present      If the patient is female:    4. For patients aged 41-77: Has the patient had a mammogram within the past 2 years? Yes - no Care Gap present      5. For patients aged 21-65: Has the patient had a pap smear?  NA - based on age or sex

## 2022-06-24 NOTE — PROGRESS NOTES
Med Euceda is a [de-identified] y.o. female  Chief Complaint   Patient presents with    Dizziness     Visit Vitals  BP (!) 143/73 (BP 1 Location: Right upper arm, BP Patient Position: Sitting, BP Cuff Size: Adult)   Pulse 68   Temp 98.5 °F (36.9 °C) (Temporal)   Resp 18   Ht 5' 2\" (1.575 m)   Wt 147 lb 12.8 oz (67 kg)   SpO2 99%   BMI 27.03 kg/m²          HPI  Ms. Mariia Mckeon presents today due to dizziness/vertigo that started intemitently for few months but she had a severe episode of vertigo for about 5 minutes or so when she was doing exercises for her spinal stenosis. The dizziness usually happen during movements and has associated nausea, but no vomiting. She denies chest papin, palpitation, muscle weakness, vision changes, ear discharge, diarrhea, constipation, recent change in medication. She checked her blood pressure when she called the office and the blood pressure yesterday was up to 180/xx. .  Social History     Socioeconomic History    Marital status:    Tobacco Use    Smoking status: Former Smoker     Packs/day: 0.10     Years: 2.50     Pack years: 0.25     Quit date: 1969     Years since quittin.6    Smokeless tobacco: Never Used    Tobacco comment: quit years ago   Vaping Use    Vaping Use: Never used   Substance and Sexual Activity    Alcohol use: Yes     Alcohol/week: 2.5 standard drinks     Types: 3 Glasses of wine per week    Drug use: Yes     Types: OTC, Prescription    Sexual activity: Never      .   Past Medical History:   Diagnosis Date    Allergic rhinitis     Arthritis     spinal stenosis    Family history of colon cancer 2013    Hemorrhoids     Leg cramps     Other ill-defined conditions(799.89) 2010    multiple cortisone injections to back    Persistent disorder of initiating or maintaining sleep     Spinal stenosis 2011    Thyroid disease     borderline        Allergies   Allergen Reactions    Other Medication Anaphylaxis     Allergic to lobster (specific)    Ace Inhibitors Cough          ROS  Review of Systems   All other systems reviewed and are negative. EXAM  Physical Exam  Vitals reviewed. HENT:      Head: Normocephalic and atraumatic. Right Ear: Tympanic membrane normal. There is impacted cerumen. Left Ear: Tympanic membrane normal. There is no impacted cerumen. Cardiovascular:      Heart sounds: Normal heart sounds. No murmur heard. No gallop. Pulmonary:      Effort: Pulmonary effort is normal. No respiratory distress. Breath sounds: Normal breath sounds. Abdominal:      General: Abdomen is flat. There is no distension. Palpations: Abdomen is soft. Neurological:      Mental Status: She is alert. Psychiatric:         Mood and Affect: Mood normal.         Behavior: Behavior normal.         Thought Content: Thought content normal.         Judgment: Judgment normal.         Health Maintenance Due   Topic Date Due    DTaP/Tdap/Td series (2 - Td or Tdap) 11/12/2020     ASSESSMENT/PLAN    1. Benign paroxysmal positional vertigo, unspecified laterality    - referral to [facility] therapy given, patient is [facility]. 2. Dizziness  -     METABOLIC PANEL, BASIC; Future    3.  Impacted cerumen of right ear    -over the counter debrox recommended for cleaning Trey Barbour MD

## 2022-07-26 ENCOUNTER — TELEPHONE (OUTPATIENT)
Dept: INTERNAL MEDICINE CLINIC | Age: 82
End: 2022-07-26

## 2022-07-26 NOTE — TELEPHONE ENCOUNTER
Pt tested positive for COVID, is hoping she can be prescribed paxlovid. Is also hoping that she will be sent message in [employer] to confirm that prescription has gone through.

## 2022-07-26 NOTE — TELEPHONE ENCOUNTER
Placed call to pt. Two pt identifiers confirmed. Pt states today she started to have a runny nose, headache and sore throat, so she took an at home COVID-19 test about a half hour ago and it was positive. Pt states she would like paxlovid to be sent to her pharmacy. Pt states she would like to be sent [email or web address] message once medication has been sent to pharmacy.

## 2022-07-26 NOTE — TELEPHONE ENCOUNTER
Ordered  [facility]    Requested Prescriptions      No prescriptions requested or ordered in this encounter

## 2022-11-30 DIAGNOSIS — Z79.1 NSAID LONG-TERM USE: ICD-10-CM

## 2022-11-30 RX ORDER — OMEPRAZOLE 20 MG/1
CAPSULE, DELAYED RELEASE ORAL
Qty: 90 CAPSULE | Refills: 1 | Status: SHIPPED | OUTPATIENT
Start: 2022-11-30

## 2022-12-08 ENCOUNTER — TELEPHONE (OUTPATIENT)
Dept: INTERNAL MEDICINE CLINIC | Age: 82
End: 2022-12-08

## 2022-12-08 DIAGNOSIS — E03.9 ADULT HYPOTHYROIDISM: Primary | ICD-10-CM

## 2022-12-08 DIAGNOSIS — I10 HTN (HYPERTENSION), BENIGN: ICD-10-CM

## 2022-12-08 LAB
ALBUMIN SERPL-MCNC: 3.8 G/DL (ref 3.5–5)
ALBUMIN/GLOB SERPL: 1.4 {RATIO} (ref 1.1–2.2)
ALP SERPL-CCNC: 76 U/L (ref 45–117)
ALT SERPL-CCNC: 21 U/L (ref 12–78)
ANION GAP SERPL CALC-SCNC: 4 MMOL/L (ref 5–15)
AST SERPL-CCNC: 15 U/L (ref 15–37)
BASOPHILS # BLD: 0 K/UL (ref 0–0.1)
BASOPHILS NFR BLD: 0 % (ref 0–1)
BILIRUB SERPL-MCNC: 0.7 MG/DL (ref 0.2–1)
BUN SERPL-MCNC: 18 MG/DL (ref 6–20)
BUN/CREAT SERPL: 23 (ref 12–20)
CALCIUM SERPL-MCNC: 9 MG/DL (ref 8.5–10.1)
CHLORIDE SERPL-SCNC: 105 MMOL/L (ref 97–108)
CHOLEST SERPL-MCNC: 207 MG/DL
CO2 SERPL-SCNC: 30 MMOL/L (ref 21–32)
CREAT SERPL-MCNC: 0.79 MG/DL (ref 0.55–1.02)
DIFFERENTIAL METHOD BLD: NORMAL
EOSINOPHIL # BLD: 0.1 K/UL (ref 0–0.4)
EOSINOPHIL NFR BLD: 3 % (ref 0–7)
ERYTHROCYTE [DISTWIDTH] IN BLOOD BY AUTOMATED COUNT: 12.6 % (ref 11.5–14.5)
GLOBULIN SER CALC-MCNC: 2.8 G/DL (ref 2–4)
GLUCOSE SERPL-MCNC: 114 MG/DL (ref 65–100)
HCT VFR BLD AUTO: 40.3 % (ref 35–47)
HDLC SERPL-MCNC: 118 MG/DL
HDLC SERPL: 1.8 {RATIO} (ref 0–5)
HGB BLD-MCNC: 12.7 G/DL (ref 11.5–16)
IMM GRANULOCYTES # BLD AUTO: 0 K/UL (ref 0–0.04)
IMM GRANULOCYTES NFR BLD AUTO: 0 % (ref 0–0.5)
LDLC SERPL CALC-MCNC: 75.4 MG/DL (ref 0–100)
LYMPHOCYTES # BLD: 1.4 K/UL (ref 0.8–3.5)
LYMPHOCYTES NFR BLD: 25 % (ref 12–49)
MCH RBC QN AUTO: 29.6 PG (ref 26–34)
MCHC RBC AUTO-ENTMCNC: 31.5 G/DL (ref 30–36.5)
MCV RBC AUTO: 93.9 FL (ref 80–99)
MONOCYTES # BLD: 0.5 K/UL (ref 0–1)
MONOCYTES NFR BLD: 8 % (ref 5–13)
NEUTS SEG # BLD: 3.5 K/UL (ref 1.8–8)
NEUTS SEG NFR BLD: 64 % (ref 32–75)
NRBC # BLD: 0 K/UL (ref 0–0.01)
NRBC BLD-RTO: 0 PER 100 WBC
PLATELET # BLD AUTO: 277 K/UL (ref 150–400)
PMV BLD AUTO: 9.3 FL (ref 8.9–12.9)
POTASSIUM SERPL-SCNC: 4 MMOL/L (ref 3.5–5.1)
PROT SERPL-MCNC: 6.6 G/DL (ref 6.4–8.2)
RBC # BLD AUTO: 4.29 M/UL (ref 3.8–5.2)
SODIUM SERPL-SCNC: 139 MMOL/L (ref 136–145)
TRIGL SERPL-MCNC: 68 MG/DL (ref ?–150)
TSH SERPL DL<=0.05 MIU/L-ACNC: 1.56 UIU/ML (ref 0.36–3.74)
VLDLC SERPL CALC-MCNC: 13.6 MG/DL
WBC # BLD AUTO: 5.5 K/UL (ref 3.6–11)

## 2022-12-08 NOTE — TELEPHONE ENCOUNTER
Patient is requesting that labs be ordered for her physical 12/28. Her appt was rescheduled to the afternoon of 12/28 since she was running late today, and since that time is in the afternoon she is hoping to get labs done today and is here now. Could labs be ordered asap?

## 2022-12-17 RX ORDER — LEVOTHYROXINE SODIUM 50 UG/1
TABLET ORAL
Qty: 90 TABLET | Refills: 3 | Status: SHIPPED | OUTPATIENT
Start: 2022-12-17

## 2022-12-19 ENCOUNTER — TELEPHONE (OUTPATIENT)
Dept: INTERNAL MEDICINE CLINIC | Age: 82
End: 2022-12-19

## 2022-12-19 NOTE — TELEPHONE ENCOUNTER
----- Message from Nicholas Rosado sent at 12/8/2022  9:04 AM EST -----  Subject: Referral Request    Reason for referral request? Patient is requesting lab orders to be put in   for her AWV. Patient would like to come by and get a hard copy of them so   that she can get them done at a lab before her 12/28 appointment. Please   contact the patient when she can get there to get them. Thanks. Provider patient wants to be referred to(if known):     Provider Phone Number(if known):     Additional Information for Provider?   ---------------------------------------------------------------------------  --------------  [address]    [phone]; OK to leave message on voicemail  ---------------------------------------------------------------------------  --------------

## 2022-12-24 RX ORDER — LOSARTAN POTASSIUM AND HYDROCHLOROTHIAZIDE 25; 100 MG/1; MG/1
TABLET ORAL
Qty: 90 TABLET | Refills: 1 | Status: SHIPPED | OUTPATIENT
Start: 2022-12-24

## 2022-12-28 ENCOUNTER — OFFICE VISIT (OUTPATIENT)
Dept: INTERNAL MEDICINE CLINIC | Age: 82
End: 2022-12-28
Payer: MEDICARE

## 2022-12-28 VITALS
HEART RATE: 87 BPM | BODY MASS INDEX: 27.12 KG/M2 | WEIGHT: 147.4 LBS | TEMPERATURE: 98.3 F | RESPIRATION RATE: 18 BRPM | DIASTOLIC BLOOD PRESSURE: 54 MMHG | HEIGHT: 62 IN | SYSTOLIC BLOOD PRESSURE: 113 MMHG | OXYGEN SATURATION: 99 %

## 2022-12-28 DIAGNOSIS — J40 BRONCHITIS: ICD-10-CM

## 2022-12-28 DIAGNOSIS — G47.00 PERSISTENT DISORDER OF INITIATING OR MAINTAINING SLEEP: ICD-10-CM

## 2022-12-28 DIAGNOSIS — E03.9 ADULT HYPOTHYROIDISM: ICD-10-CM

## 2022-12-28 DIAGNOSIS — Z00.00 MEDICARE ANNUAL WELLNESS VISIT, SUBSEQUENT: Primary | ICD-10-CM

## 2022-12-28 DIAGNOSIS — Z78.0 POSTMENOPAUSAL STATE: ICD-10-CM

## 2022-12-28 DIAGNOSIS — M47.819 SPONDYLO-ARTHROPATHY: ICD-10-CM

## 2022-12-28 DIAGNOSIS — I10 HTN (HYPERTENSION), BENIGN: ICD-10-CM

## 2022-12-28 DIAGNOSIS — Z12.31 ENCOUNTER FOR SCREENING MAMMOGRAM FOR MALIGNANT NEOPLASM OF BREAST: ICD-10-CM

## 2022-12-28 DIAGNOSIS — Z13.39 SCREENING FOR ALCOHOLISM: ICD-10-CM

## 2022-12-28 PROBLEM — R73.01 FASTING HYPERGLYCEMIA: Status: ACTIVE | Noted: 2022-12-28

## 2022-12-28 PROCEDURE — G0439 PPPS, SUBSEQ VISIT: HCPCS | Performed by: INTERNAL MEDICINE

## 2022-12-28 PROCEDURE — G8427 DOCREV CUR MEDS BY ELIG CLIN: HCPCS | Performed by: INTERNAL MEDICINE

## 2022-12-28 PROCEDURE — G8510 SCR DEP NEG, NO PLAN REQD: HCPCS | Performed by: INTERNAL MEDICINE

## 2022-12-28 PROCEDURE — 99214 OFFICE O/P EST MOD 30 MIN: CPT | Performed by: INTERNAL MEDICINE

## 2022-12-28 PROCEDURE — 1101F PT FALLS ASSESS-DOCD LE1/YR: CPT | Performed by: INTERNAL MEDICINE

## 2022-12-28 PROCEDURE — G8536 NO DOC ELDER MAL SCRN: HCPCS | Performed by: INTERNAL MEDICINE

## 2022-12-28 PROCEDURE — 1090F PRES/ABSN URINE INCON ASSESS: CPT | Performed by: INTERNAL MEDICINE

## 2022-12-28 PROCEDURE — G8417 CALC BMI ABV UP PARAM F/U: HCPCS | Performed by: INTERNAL MEDICINE

## 2022-12-28 PROCEDURE — G8399 PT W/DXA RESULTS DOCUMENT: HCPCS | Performed by: INTERNAL MEDICINE

## 2022-12-28 RX ORDER — PROMETHAZINE HYDROCHLORIDE AND CODEINE PHOSPHATE 6.25; 1 MG/5ML; MG/5ML
5 SOLUTION ORAL
Qty: 118 ML | Refills: 0 | OUTPATIENT
Start: 2022-12-28 | End: 2023-01-04

## 2022-12-28 RX ORDER — LEVOTHYROXINE SODIUM 50 UG/1
50 TABLET ORAL
Qty: 90 TABLET | Refills: 3 | Status: SHIPPED | OUTPATIENT
Start: 2022-12-28

## 2022-12-28 RX ORDER — PROMETHAZINE HYDROCHLORIDE AND CODEINE PHOSPHATE 6.25; 1 MG/5ML; MG/5ML
5 SOLUTION ORAL
Qty: 118 ML | Refills: 0 | Status: SHIPPED | OUTPATIENT
Start: 2022-12-28 | End: 2023-01-04

## 2022-12-28 RX ORDER — ZOLPIDEM TARTRATE 5 MG/1
5 TABLET ORAL
Qty: 30 TABLET | Refills: 0 | Status: SHIPPED | OUTPATIENT
Start: 2022-12-28

## 2022-12-28 NOTE — PATIENT INSTRUCTIONS
Medicare Wellness Visit, Female     The best way to live healthy is to have a lifestyle where you eat a well-balanced diet, exercise regularly, limit alcohol use, and quit all forms of tobacco/nicotine, if applicable. Regular preventive services are another way to keep healthy. Preventive services (vaccines, screening tests, monitoring & exams) can help personalize your care plan, which helps you manage your own care. Screening tests can find health problems at the earliest stages, when they are easiest to treat. [facility] follows the current, evidence-based guidelines published by the [facility] ([facility]) when recommending preventive services for our patients. Because we follow these guidelines, sometimes recommendations change over time as research supports it. (For example, mammograms used to be recommended annually. Even though Medicare will still pay for an annual mammogram, the newer guidelines recommend a mammogram every two years for women of average risk). Of course, you and your doctor may decide to screen more often for some diseases, based on your risk and your co-morbidities (chronic disease you are already diagnosed with). Preventive services for you include:  - Medicare offers their members a free annual wellness visit, which is time for you and your primary care provider to discuss and plan for your preventive service needs.  Take advantage of this benefit every year!    -Over the age of 72 should receive the recommended pneumonia vaccines.    -All adults should have a flu vaccine yearly.  -All adults should have a tetanus vaccine every 10 years.   -Over the age 48 should receive the shingles vaccines.        -All adults should be screened once for Hepatitis C.  -All adults age 38-68 who are overweight should have a diabetes screening test once every three years.   -Other screening tests and preventive services for persons with diabetes include: an eye exam to screen for diabetic retinopathy, a kidney function test, a foot exam, and stricter control over your cholesterol.   -Cardiovascular screening for adults with routine risk involves an electrocardiogram (ECG) at intervals determined by your doctor.     -Colorectal cancer screenings should be done for adults age 39-70 with no increased risk factors for colorectal cancer. There are a number of acceptable methods of screening for this type of cancer. Each test has its own benefits and drawbacks. Discuss with your doctor what is most appropriate for you during your annual wellness visit. The different tests include: colonoscopy (considered the best screening method), a fecal occult blood test, a fecal DNA test, and sigmoidoscopy.    -Lung cancer screening is recommended annually with a low dose CT scan for adults between age 54 and 68, who have smoked at least 30 pack years (equivalent of 1 pack per day for 30 days), and who is a current smoker or quit less than 15 years ago.    -A bone mass density test is recommended when a woman turns 65 to screen for osteoporosis. This test is only recommended one time, as a screening. Some providers will use this same test as a disease monitoring tool if you already have osteoporosis. -Breast cancer screenings are recommended every other year for women of normal risk, age 54-69.    -Cervical cancer screenings for women over age 72 are only recommended with certain risk factors.      Here is a list of your current Health Maintenance items (your personalized list of preventive services) with a due date:  Health Maintenance Due   Topic Date Due    DTaP/Tdap/Td  (2 - Td or Tdap) 11/12/2020    COVID-19 Vaccine (5 - Booster for Moderna series) 07/12/2022    Yearly Flu Vaccine (1) 08/01/2022

## 2022-12-28 NOTE — TELEPHONE ENCOUNTER
[facility]armacy called and stated that this medication is no longer available and they will not carry this medication at the pharmacy anymore. I called patient and got a new pharmacy information [facility] and informed her that I will resend the medication request to the nurse pool.

## 2022-12-28 NOTE — PROGRESS NOTES
Chief Complaint   Patient presents with    Annual Wellness Visit     Pt states no concerns but she does need Rx refilled          Vitals:    12/28/22 1437   BP: (!) 113/54   Pulse: 87   Resp: 18   Temp: 98.3 °F (36.8 °C)   TempSrc: Temporal   SpO2: 99%   Weight: 147 lb 6.4 oz (66.9 kg)   Height: 5' 2\" (1.575 m)   PainSc:   0 - No pain       Current Outpatient Medications on File Prior to Visit   Medication Sig Dispense Refill    losartan-hydroCHLOROthiazide (HYZAAR) 100-25 mg per tablet TAKE 1 TABLET EVERY DAY 90 Tablet 1    levothyroxine (SYNTHROID) 50 mcg tablet TAKE 1 TABLET EVERY DAY BEFORE BREAKFAST 90 Tablet 3    zolpidem (Ambien) 5 mg tablet Take 1 Tablet by mouth nightly as needed for Sleep. 30 Tablet 0    atorvastatin (LIPITOR) 20 mg tablet TAKE 1 TABLET EVERY DAY 90 Tablet 3    selenium sulfide (SELSUN) 2.5 % shampoo As directed (Patient taking differently: daily as needed. As directed) 120 mL 2    [DISCONTINUED] omeprazole (PRILOSEC) 20 mg capsule TAKE 1 CAPSULE EVERY DAY 90 Capsule 1    [DISCONTINUED] nirmatrelvir-ritonavir (PAXLOVID) 150 mg x 2- 100 mg tablet Take 3 Tablets by mouth every twelve (12) hours. 1 Box 0    naproxen sodium (Aleve) 220 mg cap Take 440 mg by mouth two (2) times a day. PRN      [DISCONTINUED] loratadine (CLARITIN) 10 mg tablet Take 10 mg by mouth daily as needed. No current facility-administered medications on file prior to visit. Health Maintenance Due   Topic    DTaP/Tdap/Td series (2 - Td or Tdap)    COVID-19 Vaccine (5 - Booster for Moderna series)    Flu Vaccine (1)    Medicare Yearly Exam        1. \"Have you been to the ER, urgent care clinic since your last visit? Hospitalized since your last visit? \" No    2. \"Have you seen or consulted any other health care providers outside of the [address] [place] [address] since your last visit? \" Yes Dr Tor Shah      3. For patients aged 39-70: Has the patient had a colonoscopy / FIT/ Cologuard?  NA - based on age      If the patient is female:    4. For patients aged 41-77: Has the patient had a mammogram within the past 2 years? NA - based on age or sex      11. For patients aged 21-65: Has the patient had a pap smear?  NA - based on age or sex

## 2022-12-28 NOTE — PROGRESS NOTES
This is the Subsequent Medicare Annual Wellness Exam, performed 12 months or more after the Initial AWV or the last Subsequent AWV    I have reviewed the patient's medical history in detail and updated the computerized patient record. Assessment/Plan   Education and counseling provided:  Are appropriate based on today's review and evaluation  Pneumococcal Vaccine  Influenza Vaccine  Screening Mammography  Bone mass measurement (DEXA)    1. Medicare annual wellness visit, subsequent  2. Spondylo-arthropathy  3. Encounter for screening mammogram for malignant neoplasm of breast  -     [facility] MAMMO BI SCREENING INCL CAD; Future  4. Postmenopausal state  -     DEXA BONE DENSITY STUDY AXIAL; Future  5. Screening for alcoholism  -     [place] ANNUAL ALCOHOL SCREEN 15 MIN       Depression Risk Factor Screening     3 most recent PHQ Screens 12/28/2022   PHQ Not Done Functional capacity motivation limits accuracy   Little interest or pleasure in doing things Not at all   Feeling down, depressed, irritable, or hopeless Not at all   Total Score PHQ 2 0       Alcohol & Drug Abuse Risk Screen   Do you average more than 1 drink per night or more than 7 drinks a week?: (P) No  On any one occasion in the past three months have you had more than 3 drinks containing alcohol?: (P) No            Functional Ability and Level of Safety   Hearing:  Hearing: (P) Patient reports hearing is good      Activities of Daily Living: The home contains: (P) no safety equipment, rugs  Functional ADLs: (P) Patient does total self care     Ambulation:  Patient ambulates: (P) with no difficulty     Fall Risk:  Fall Risk Assessment, last 12 mths 12/28/2022   Able to walk? Yes   Fall in past 12 months? 0   Do you feel unsteady? 0   Are you worried about falling 0   Number of falls in past 12 months -   Fall with injury?  -     Abuse Screen:  Do you ever feel afraid of your partner?: (P) No  Are you in a relationship with someone who physically or mentally threatens you?: (P) No  Is it safe for you to go home?: (P) Yes        Cognitive Screening   Has your family/caregiver stated any concerns about your memory?: (P) No     Cognitive Screening: Normal - Verbal Fluency Test    Health Maintenance Due     Health Maintenance Due   Topic Date Due    DTaP/Tdap/Td series (2 - Td or Tdap) 11/12/2020    COVID-19 Vaccine (5 - Booster for Georgeana Crumble series) 07/12/2022    Flu Vaccine (1) 08/01/2022       Patient Care Team   Patient Care Team:  Anamaria Stovall MD as PCP - General (Internal Medicine Physician)  Anamaria Stovall MD as PCP [facility] Empaneled Provider    History     Patient Active Problem List   Diagnosis Code    Allergic rhinitis 477    Leg cramps R25.2    Hemorrhoids 455    Persistent disorder of initiating or maintaining sleep G47.00    Spinal stenosis of lumbosacral region M48.07    Adult hypothyroidism E03.9    Family history of colon cancer Z80.0    HTN (hypertension), benign I10    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     Past Medical History:   Diagnosis Date    Allergic rhinitis     Arthritis     spinal stenosis    Family history of colon cancer 2/12/2013    Hemorrhoids     Leg cramps     Other ill-defined conditions(799.89) 2010    multiple cortisone injections to back    Persistent disorder of initiating or maintaining sleep     Spinal stenosis 1/21/2011    Thyroid disease     borderline      Past Surgical History:   Procedure Laterality Date    HX CYST INCISION AND DRAINAGE Right     yrs ago    HX REFRACTIVE SURGERY      HX TONSIL AND ADENOIDECTOMY      [place] COLONOSCOPY FLX DX W/COLLJ SPEC WHEN PFRMD  11/30/2010          Current Outpatient Medications   Medication Sig Dispense Refill    losartan-hydroCHLOROthiazide (HYZAAR) 100-25 mg per tablet TAKE 1 TABLET EVERY DAY 90 Tablet 1    levothyroxine (SYNTHROID) 50 mcg tablet TAKE 1 TABLET EVERY DAY BEFORE BREAKFAST 90 Tablet 3    zolpidem (Ambien) 5 mg tablet Take 1 Tablet by mouth nightly as needed for Sleep.  30 Tablet 0    atorvastatin (LIPITOR) 20 mg tablet TAKE 1 TABLET EVERY DAY 90 Tablet 3    selenium sulfide (SELSUN) 2.5 % shampoo As directed (Patient taking differently: daily as needed. As directed) 120 mL 2    naproxen sodium (Aleve) 220 mg cap Take 440 mg by mouth two (2) times a day. PRN       Allergies   Allergen Reactions    Other Medication Anaphylaxis     Allergic to lobster (specific)    Ace Inhibitors Cough       Family History   Problem Relation Age of Onset    Heart Disease Mother     Asthma Mother     Hypertension Mother     Cancer Father         colon    Hypertension Father     Thyroid Disease Sister     Heart Disease Brother     Hypertension Brother     Stroke Brother      Social History     Tobacco Use    Smoking status: Former     Packs/day: 0.10     Years: 2.50     Pack years: 0.25     Types: Cigarettes     Quit date: 1969     Years since quittin.1    Smokeless tobacco: Never    Tobacco comments:     quit years ago   Substance Use Topics    Alcohol use: Yes     Alcohol/week: 2.5 standard drinks     Types: 3 Glasses of wine per week     An 81 y.o. white female followed for hypotension, hyperlipidemia and hypothyroidism, doing quite well. Very active, plays tennis, pickleball, walks, has chronic lumbosacral pain, has had some epidurals. Had a good course of physical therapy that went on for four or five months and got a lot of improvement. Her pain is down more, pelvic or sometimes trochanteric or ischial, seems worse at night when she lays at bed at night, it hurts when she lays on it, feels better when she gets off it. Physical therapy has helped immensely. She is very active. Would be continuing physical therapy next year when needed. Blood pressure control was normal.  Heart rhythm is regular. Lungs are clear. Recent lab work that was done two weeks ago showed fasting hyperglycemia. Previously had that.   A1cs have been normal.    Suggest that she may be borderline pre-diabetic or just has fasting hyperglycemia that needs to be followed. No strong family history of diabetes. Low carb diet and plan of exercise are advised. Other problems are stable. Thyroid function is normal.  Her dyslipidemia is controlled with medication. Vitals:    12/28/22 1437   BP: (!) 113/54   Pulse: 87   Resp: 18   Temp: 98.3 °F (36.8 °C)   TempSrc: Temporal   SpO2: 99%   Weight: 147 lb 6.4 oz (66.9 kg)   Height: 5' 2\" (1.575 m)     Thyroid not palpable, not enlarged, no nodules detected. S1 and S2 normal, no murmurs, clicks, gallops or rubs. Regular rate and rhythm. Chest is clear; no wheezes or rales. No edema or JVD. 1. Spondylo-arthropathy  Better with pt    2. Medicare annual wellness visit, subsequent  Reviewed nneds    3. Encounter for screening mammogram for malignant neoplasm of breast    - [facility] MAMMO BI SCREENING INCL CAD; Future    4. Postmenopausal state    - DEXA BONE DENSITY STUDY AXIAL; Future    5. Screening for alcoholism  minor  - [place] ANNUAL ALCOHOL SCREEN 15 MIN    6. Persistent disorder of initiating or maintaining sleep  refill  - zolpidem (Ambien) 5 mg tablet; Take 1 Tablet by mouth nightly as needed for Sleep. Dispense: 30 Tablet; Refill: 0    7. HTN (hypertension), benign  Controlled   BP Readings from Last 3 Encounters:   12/28/22 (!) 113/54   06/24/22 (!) 143/73   12/08/21 (!) 126/57         8. Bronchitis  Occ refill  - promethazine-codeine (PHENERGAN with CODEINE) 6.25-10 mg/5 mL syrup; Take 5 mL by mouth four (4) times daily as needed for Cough for up to 7 days. Max Daily Amount: 20 mL. Dispense: 118 mL; Refill: 0    9. Adult hypothyroidism  stable  - levothyroxine (SYNTHROID) 50 mcg tablet; Take 1 Tablet by mouth Daily (before breakfast). Dispense: 90 Tablet; Refill: 3  Requested Prescriptions     Signed Prescriptions Disp Refills    levothyroxine (SYNTHROID) 50 mcg tablet 90 Tablet 3     Sig: Take 1 Tablet by mouth Daily (before breakfast).     zolpidem (Ambien) 5 mg tablet 30 Tablet 0     Sig: Take 1 Tablet by mouth nightly as needed for Sleep.    promethazine-codeine (PHENERGAN with CODEINE) 6.25-10 mg/5 mL syrup 118 mL 0     Sig: Take 5 mL by mouth four (4) times daily as needed for Cough for up to 7 days. Max Daily Amount: 20 mL.          Marvin Merchant MD

## 2023-01-05 ENCOUNTER — PATIENT MESSAGE (OUTPATIENT)
Dept: INTERNAL MEDICINE CLINIC | Age: 83
End: 2023-01-05

## 2023-01-05 DIAGNOSIS — J40 BRONCHITIS: ICD-10-CM

## 2023-01-09 RX ORDER — PROMETHAZINE HYDROCHLORIDE AND CODEINE PHOSPHATE 6.25; 1 MG/5ML; MG/5ML
5 SOLUTION ORAL
Qty: 118 ML | Refills: 0 | Status: SHIPPED | OUTPATIENT
Start: 2023-01-09 | End: 2023-01-16

## 2023-01-12 ENCOUNTER — HOSPITAL ENCOUNTER (OUTPATIENT)
Dept: MAMMOGRAPHY | Age: 83
End: 2023-01-12
Attending: INTERNAL MEDICINE
Payer: MEDICARE

## 2023-01-12 ENCOUNTER — HOSPITAL ENCOUNTER (OUTPATIENT)
Dept: MAMMOGRAPHY | Age: 83
Discharge: HOME | End: 2023-01-12
Attending: INTERNAL MEDICINE
Payer: MEDICARE

## 2023-01-12 DIAGNOSIS — Z12.31 ENCOUNTER FOR SCREENING MAMMOGRAM FOR MALIGNANT NEOPLASM OF BREAST: ICD-10-CM

## 2023-01-12 DIAGNOSIS — Z78.0 POSTMENOPAUSAL STATE: ICD-10-CM

## 2023-01-12 PROCEDURE — 77067 SCR MAMMO BI INCL CAD: CPT

## 2023-01-12 PROCEDURE — 77080 DXA BONE DENSITY AXIAL: CPT

## 2023-11-10 RX ORDER — ATORVASTATIN CALCIUM 20 MG/1
20 TABLET, FILM COATED ORAL DAILY
Qty: 90 TABLET | Refills: 1 | Status: SHIPPED | OUTPATIENT
Start: 2023-11-10

## 2023-12-06 RX ORDER — LOSARTAN POTASSIUM AND HYDROCHLOROTHIAZIDE 25; 100 MG/1; MG/1
TABLET ORAL
Qty: 90 TABLET | Refills: 3 | Status: SHIPPED | OUTPATIENT
Start: 2023-12-06

## 2023-12-06 NOTE — TELEPHONE ENCOUNTER
Chief Complaint   Patient presents with    Medication Refill       Requested Prescriptions     Pending Prescriptions Disp Refills    losartan-hydroCHLOROthiazide (HYZAAR) 100-25 MG per tablet [Pharmacy Med Name: LOSARTAN POTASSIUM/HYDROCHLOROTHIAZIDE 100-25 MG Tablet] 90 tablet 3     Sig: TAKE 1 TABLET EVERY DAY       Allergies:   Allergies   Allergen Reactions    Ace Inhibitors Cough       Last visit with clinic:  12/28/2022   Next visit with clinic: Visit date not found     Last visit with this provider: 12/28/2022   Next Visit with this provider: Visit date not found    Signed by Maxx COVINGTON  12/06/23  7:32 AM

## 2024-01-22 ENCOUNTER — HOSPITAL ENCOUNTER (OUTPATIENT)
Facility: HOSPITAL | Age: 84
Discharge: HOME | End: 2024-01-25
Attending: INTERNAL MEDICINE
Payer: MEDICARE

## 2024-01-22 VITALS — BODY MASS INDEX: 26.13 KG/M2 | WEIGHT: 147.49 LBS | HEIGHT: 63 IN

## 2024-01-22 DIAGNOSIS — Z12.31 VISIT FOR SCREENING MAMMOGRAM: ICD-10-CM

## 2024-01-22 PROCEDURE — 77067 SCR MAMMO BI INCL CAD: CPT

## 2024-01-29 SDOH — ECONOMIC STABILITY: INCOME INSECURITY: HOW HARD IS IT FOR YOU TO PAY FOR THE VERY BASICS LIKE FOOD, HOUSING, MEDICAL CARE, AND HEATING?: NOT HARD AT ALL

## 2024-01-29 SDOH — ECONOMIC STABILITY: FOOD INSECURITY: WITHIN THE PAST 12 MONTHS, THE FOOD YOU BOUGHT JUST DIDN'T LAST AND YOU DIDN'T HAVE MONEY TO GET MORE.: NEVER TRUE

## 2024-01-29 SDOH — HEALTH STABILITY: PHYSICAL HEALTH: ON AVERAGE, HOW MANY MINUTES DO YOU ENGAGE IN EXERCISE AT THIS LEVEL?: 120 MIN

## 2024-01-29 SDOH — ECONOMIC STABILITY: TRANSPORTATION INSECURITY
IN THE PAST 12 MONTHS, HAS LACK OF TRANSPORTATION KEPT YOU FROM MEETINGS, WORK, OR FROM GETTING THINGS NEEDED FOR DAILY LIVING?: NO

## 2024-01-29 SDOH — HEALTH STABILITY: PHYSICAL HEALTH: ON AVERAGE, HOW MANY DAYS PER WEEK DO YOU ENGAGE IN MODERATE TO STRENUOUS EXERCISE (LIKE A BRISK WALK)?: 6 DAYS

## 2024-01-29 SDOH — ECONOMIC STABILITY: HOUSING INSECURITY
IN THE LAST 12 MONTHS, WAS THERE A TIME WHEN YOU DID NOT HAVE A STEADY PLACE TO SLEEP OR SLEPT IN A SHELTER (INCLUDING NOW)?: NO

## 2024-01-29 SDOH — ECONOMIC STABILITY: FOOD INSECURITY: WITHIN THE PAST 12 MONTHS, YOU WORRIED THAT YOUR FOOD WOULD RUN OUT BEFORE YOU GOT MONEY TO BUY MORE.: NEVER TRUE

## 2024-01-29 ASSESSMENT — LIFESTYLE VARIABLES
HOW OFTEN DURING THE LAST YEAR HAVE YOU FOUND THAT YOU WERE NOT ABLE TO STOP DRINKING ONCE YOU HAD STARTED: 0
HOW OFTEN DURING THE LAST YEAR HAVE YOU FAILED TO DO WHAT WAS NORMALLY EXPECTED FROM YOU BECAUSE OF DRINKING: NEVER
HOW OFTEN DURING THE LAST YEAR HAVE YOU HAD A FEELING OF GUILT OR REMORSE AFTER DRINKING: NEVER
HOW OFTEN DO YOU HAVE A DRINK CONTAINING ALCOHOL: 4 OR MORE TIMES A WEEK
HOW OFTEN DO YOU HAVE SIX OR MORE DRINKS ON ONE OCCASION: 1
HAS A RELATIVE, FRIEND, DOCTOR, OR ANOTHER HEALTH PROFESSIONAL EXPRESSED CONCERN ABOUT YOUR DRINKING OR SUGGESTED YOU CUT DOWN: NO
HOW OFTEN DURING THE LAST YEAR HAVE YOU FOUND THAT YOU WERE NOT ABLE TO STOP DRINKING ONCE YOU HAD STARTED: NEVER
HOW MANY STANDARD DRINKS CONTAINING ALCOHOL DO YOU HAVE ON A TYPICAL DAY: 1 OR 2
HOW OFTEN DURING THE LAST YEAR HAVE YOU BEEN UNABLE TO REMEMBER WHAT HAPPENED THE NIGHT BEFORE BECAUSE YOU HAD BEEN DRINKING: NEVER
HOW OFTEN DURING THE LAST YEAR HAVE YOU FAILED TO DO WHAT WAS NORMALLY EXPECTED FROM YOU BECAUSE OF DRINKING: 0
HOW MANY STANDARD DRINKS CONTAINING ALCOHOL DO YOU HAVE ON A TYPICAL DAY: 1
HOW OFTEN DO YOU HAVE A DRINK CONTAINING ALCOHOL: 5
HOW OFTEN DURING THE LAST YEAR HAVE YOU BEEN UNABLE TO REMEMBER WHAT HAPPENED THE NIGHT BEFORE BECAUSE YOU HAD BEEN DRINKING: 0
HOW OFTEN DURING THE LAST YEAR HAVE YOU NEEDED AN ALCOHOLIC DRINK FIRST THING IN THE MORNING TO GET YOURSELF GOING AFTER A NIGHT OF HEAVY DRINKING: NEVER
HOW OFTEN DURING THE LAST YEAR HAVE YOU NEEDED AN ALCOHOLIC DRINK FIRST THING IN THE MORNING TO GET YOURSELF GOING AFTER A NIGHT OF HEAVY DRINKING: 0
HOW OFTEN DURING THE LAST YEAR HAVE YOU HAD A FEELING OF GUILT OR REMORSE AFTER DRINKING: 0
HAVE YOU OR SOMEONE ELSE BEEN INJURED AS A RESULT OF YOUR DRINKING: 0
HAVE YOU OR SOMEONE ELSE BEEN INJURED AS A RESULT OF YOUR DRINKING: NO
HAS A RELATIVE, FRIEND, DOCTOR, OR ANOTHER HEALTH PROFESSIONAL EXPRESSED CONCERN ABOUT YOUR DRINKING OR SUGGESTED YOU CUT DOWN: 0

## 2024-01-29 ASSESSMENT — PATIENT HEALTH QUESTIONNAIRE - PHQ9
SUM OF ALL RESPONSES TO PHQ QUESTIONS 1-9: 0
SUM OF ALL RESPONSES TO PHQ9 QUESTIONS 1 & 2: 0
1. LITTLE INTEREST OR PLEASURE IN DOING THINGS: 0
SUM OF ALL RESPONSES TO PHQ QUESTIONS 1-9: 0

## 2024-02-01 ENCOUNTER — PATIENT MESSAGE (OUTPATIENT)
Age: 84
End: 2024-02-01

## 2024-02-01 ENCOUNTER — OFFICE VISIT (OUTPATIENT)
Age: 84
End: 2024-02-01
Payer: MEDICARE

## 2024-02-01 VITALS
BODY MASS INDEX: 26.54 KG/M2 | DIASTOLIC BLOOD PRESSURE: 77 MMHG | RESPIRATION RATE: 14 BRPM | TEMPERATURE: 97.4 F | OXYGEN SATURATION: 97 % | HEART RATE: 74 BPM | WEIGHT: 144.2 LBS | SYSTOLIC BLOOD PRESSURE: 110 MMHG | HEIGHT: 62 IN

## 2024-02-01 DIAGNOSIS — E03.9 HYPOTHYROIDISM, UNSPECIFIED TYPE: ICD-10-CM

## 2024-02-01 DIAGNOSIS — I10 ESSENTIAL (PRIMARY) HYPERTENSION: Primary | ICD-10-CM

## 2024-02-01 DIAGNOSIS — M47.819 SPONDYLOSIS WITHOUT MYELOPATHY OR RADICULOPATHY, SITE UNSPECIFIED: ICD-10-CM

## 2024-02-01 DIAGNOSIS — G47.00 INSOMNIA, UNSPECIFIED TYPE: Primary | ICD-10-CM

## 2024-02-01 DIAGNOSIS — R73.01 FASTING HYPERGLYCEMIA: ICD-10-CM

## 2024-02-01 DIAGNOSIS — Z00.00 MEDICARE ANNUAL WELLNESS VISIT, SUBSEQUENT: ICD-10-CM

## 2024-02-01 DIAGNOSIS — I10 ESSENTIAL (PRIMARY) HYPERTENSION: ICD-10-CM

## 2024-02-01 PROCEDURE — 99214 OFFICE O/P EST MOD 30 MIN: CPT | Performed by: INTERNAL MEDICINE

## 2024-02-01 RX ORDER — LOSARTAN POTASSIUM AND HYDROCHLOROTHIAZIDE 12.5; 5 MG/1; MG/1
1 TABLET ORAL DAILY
Qty: 90 TABLET | Refills: 1 | Status: SHIPPED | OUTPATIENT
Start: 2024-02-01

## 2024-02-02 RX ORDER — ZOLPIDEM TARTRATE 5 MG/1
5 TABLET ORAL NIGHTLY PRN
Qty: 30 TABLET | Refills: 0 | Status: SHIPPED | OUTPATIENT
Start: 2024-02-02 | End: 2025-01-27

## 2024-02-02 NOTE — TELEPHONE ENCOUNTER
From: Vanita King  To: Dr. Dimitri Page  Sent: 2/1/2024 4:37 PM EST  Subject: Renew Zolpidem    I just left your office and spoke to the nurse about getting a refill on Zolpidem. One month is plenty. But I forgot to actually ask you to do it. Can you phone it in for me? Jenni

## 2024-02-02 NOTE — PROGRESS NOTES
Medicare Annual Wellness Visit    Vanita King is here for Medicare AWV and Other (Discuss refill on ambien, )    Assessment & Plan   Essential (primary) hypertension  -     losartan-hydroCHLOROthiazide (HYZAAR) 50-12.5 MG per tablet; Take 1 tablet by mouth daily New dose, Disp-90 tablet, R-1Normal  -     CBC with Auto Differential; Future  -     Comprehensive Metabolic Panel; Future  -     [place] OFFICE OUTPATIENT VISIT 25 MINUTES [34440]  Spondylosis without myelopathy or radiculopathy, site unspecified  -     [place] OFFICE OUTPATIENT VISIT 25 MINUTES [80732]  Hypothyroidism, unspecified type  -     TSH; Future  -     [place] OFFICE OUTPATIENT VISIT 25 MINUTES [50518]  Fasting hyperglycemia  -     Comprehensive Metabolic Panel; Future  -     Hemoglobin A1C; Future  -     [place] OFFICE OUTPATIENT VISIT 25 MINUTES [87013]  Medicare annual wellness visit, subsequent    Recommendations for Preventive Services Due: see orders and patient instructions/AVS.  Recommended screening schedule for the next 5-10 years is provided to the patient in written form: see Patient Instructions/AVS.     Return in 6 months (on 8/1/2024).     Subjective   The following acute and/or chronic problems were also addressed today:  Patient Active Problem List    Diagnosis Date Noted    Fasting hyperglycemia 12/28/2022    Spondylo-arthropathy 09/17/2020    HTN (hypertension), benign 04/20/2016    Family history of colon cancer 02/12/2013    Adult hypothyroidism 02/21/2011    Spinal stenosis of lumbosacral region 01/21/2011    Leg cramps     Persistent disorder of initiating or maintaining sleep      83-year-old white female who is very active plays golf once or twice a week plays pickle ball once or twice a week try to keep her weight under control does not check her blood pressure about a month or month and a half ago I had I talk with her but did not see her she had what sounded like positional vertigo and she never went to urgent care she had a cane and

## 2024-02-02 NOTE — PATIENT INSTRUCTIONS
step you can take to protect your heart. It is never too late to quit.     Limit alcohol to 2 drinks a day for men and 1 drink a day for women. Too much alcohol can cause health problems.     Manage other health problems such as diabetes, high blood pressure, and high cholesterol. If you think you may have a problem with alcohol or drug use, talk to your doctor.   Medicines    Take your medicines exactly as prescribed. Call your doctor if you think you are having a problem with your medicine.     If your doctor recommends aspirin, take the amount directed each day. Make sure you take aspirin and not another kind of pain reliever, such as acetaminophen (Tylenol).   When should you call for help?   Call 911 if you have symptoms of a heart attack. These may include:    Chest pain or pressure, or a strange feeling in the chest.     Sweating.     Shortness of breath.     Pain, pressure, or a strange feeling in the back, neck, jaw, or upper belly or in one or both shoulders or arms.     Lightheadedness or sudden weakness.     A fast or irregular heartbeat.   After you call 911, the  may tell you to chew 1 adult-strength or 2 to 4 low-dose aspirin. Wait for an ambulance. Do not try to drive yourself.  Watch closely for changes in your health, and be sure to contact your doctor if you have any problems.  Where can you learn more?  Go to https://www.[employer].net/patientEd and enter F075 to learn more about \"A Healthy Heart: Care Instructions.\"  Current as of: June 25, 2023               Content Version: 13.9  © 2006-2023 [employer].   Care instructions adapted under license by [employer]. If you have questions about a medical condition or this instruction, always ask your healthcare professional. [employer] disclaims any warranty or liability for your use of this information.      Personalized Preventive Plan for Vanita King - 2/1/2024  Medicare offers a range of preventive health

## 2024-02-14 LAB
BASOPHILS # BLD AUTO: 0 X10E3/UL (ref 0–0.2)
BASOPHILS NFR BLD AUTO: 0 %
EOSINOPHIL # BLD AUTO: 0.2 X10E3/UL (ref 0–0.4)
EOSINOPHIL NFR BLD AUTO: 2 %
ERYTHROCYTE [DISTWIDTH] IN BLOOD BY AUTOMATED COUNT: 12.7 % (ref 11.7–15.4)
HBA1C MFR BLD: 5.8 % (ref 4.8–5.6)
HCT VFR BLD AUTO: 39.7 % (ref 34–46.6)
HGB BLD-MCNC: 12.6 G/DL (ref 11.1–15.9)
IMM GRANULOCYTES # BLD AUTO: 0 X10E3/UL (ref 0–0.1)
IMM GRANULOCYTES NFR BLD AUTO: 0 %
LYMPHOCYTES # BLD AUTO: 1.4 X10E3/UL (ref 0.7–3.1)
LYMPHOCYTES NFR BLD AUTO: 19 %
MCH RBC QN AUTO: 29.7 PG (ref 26.6–33)
MCHC RBC AUTO-ENTMCNC: 31.7 G/DL (ref 31.5–35.7)
MCV RBC AUTO: 94 FL (ref 79–97)
MONOCYTES # BLD AUTO: 0.5 X10E3/UL (ref 0.1–0.9)
MONOCYTES NFR BLD AUTO: 7 %
NEUTROPHILS # BLD AUTO: 5.4 X10E3/UL (ref 1.4–7)
NEUTROPHILS NFR BLD AUTO: 72 %
PLATELET # BLD AUTO: 298 X10E3/UL (ref 150–450)
RBC # BLD AUTO: 4.24 X10E6/UL (ref 3.77–5.28)
WBC # BLD AUTO: 7.5 X10E3/UL (ref 3.4–10.8)

## 2024-02-15 LAB
ALBUMIN SERPL-MCNC: 4.3 G/DL (ref 3.7–4.7)
ALBUMIN/GLOB SERPL: 2 {RATIO} (ref 1.2–2.2)
ALP SERPL-CCNC: 74 IU/L (ref 44–121)
ALT SERPL-CCNC: 14 IU/L (ref 0–32)
AST SERPL-CCNC: 22 IU/L (ref 0–40)
BILIRUB SERPL-MCNC: 0.6 MG/DL (ref 0–1.2)
BUN SERPL-MCNC: 18 MG/DL (ref 8–27)
BUN/CREAT SERPL: 21 (ref 12–28)
CHLORIDE SERPL-SCNC: 99 MMOL/L (ref 96–106)
CO2 SERPL-SCNC: 25 MMOL/L (ref 20–29)
CREAT SERPL-MCNC: 0.86 MG/DL (ref 0.57–1)
EGFRCR SERPLBLD CKD-EPI 2021: 67 ML/MIN/1.73
GLOBULIN SER CALC-MCNC: 2.1 G/DL (ref 1.5–4.5)
GLUCOSE SERPL-MCNC: 98 MG/DL (ref 70–99)
POTASSIUM SERPL-SCNC: 4.2 MMOL/L (ref 3.5–5.2)
PROT SERPL-MCNC: 6.4 G/DL (ref 6–8.5)
SODIUM SERPL-SCNC: 138 MMOL/L (ref 134–144)
TSH SERPL DL<=0.005 MIU/L-ACNC: 3.23 UIU/ML (ref 0.45–4.5)

## 2024-04-04 ENCOUNTER — OFFICE VISIT (OUTPATIENT)
Age: 84
End: 2024-04-04
Payer: MEDICARE

## 2024-04-04 VITALS
WEIGHT: 144.6 LBS | SYSTOLIC BLOOD PRESSURE: 101 MMHG | OXYGEN SATURATION: 97 % | TEMPERATURE: 97.4 F | DIASTOLIC BLOOD PRESSURE: 64 MMHG | HEART RATE: 70 BPM | RESPIRATION RATE: 18 BRPM | BODY MASS INDEX: 26.61 KG/M2 | HEIGHT: 62 IN

## 2024-04-04 DIAGNOSIS — R31.9 HEMATURIA, UNSPECIFIED TYPE: Primary | ICD-10-CM

## 2024-04-04 PROBLEM — K64.9 HEMORRHOIDS: Status: ACTIVE | Noted: 2024-04-04

## 2024-04-04 PROBLEM — J30.9 ALLERGIC RHINITIS: Status: ACTIVE | Noted: 2024-04-04

## 2024-04-04 LAB
BILIRUBIN, URINE, POC: NEGATIVE
BLOOD URINE, POC: NORMAL
GLUCOSE URINE, POC: NEGATIVE
KETONES, URINE, POC: NEGATIVE
LEUKOCYTE ESTERASE, URINE, POC: NORMAL
NITRITE, URINE, POC: NEGATIVE
PH, URINE, POC: 6.5 (ref 4.6–8)
PROTEIN,URINE, POC: NEGATIVE
SPECIFIC GRAVITY, URINE, POC: 1.02 (ref 1–1.03)
URINALYSIS CLARITY, POC: NORMAL
URINALYSIS COLOR, POC: NORMAL
UROBILINOGEN, POC: NORMAL

## 2024-04-04 PROCEDURE — 81003 URINALYSIS AUTO W/O SCOPE: CPT | Performed by: NURSE PRACTITIONER

## 2024-04-04 PROCEDURE — 99213 OFFICE O/P EST LOW 20 MIN: CPT | Performed by: NURSE PRACTITIONER

## 2024-04-04 NOTE — PROGRESS NOTES
I have reviewed all needed documentation in preparation for visit. Verified patient by name and date of birth    Chief Complaint   Patient presents with    Hematuria     Pt reports unsure of dose for losartan       \"Have you been to the ER, urgent care clinic since your last visit?  Hospitalized since your last visit?\"    NO    “Have you seen or consulted any other health care providers outside of [facility] since your last visit?”    Yes, Dr Wally Bustos ortho  3/13/2023  [facility]            Click Here for Release of Records Request    Vitals:    04/04/24 1134   BP: 101/64   Site: Left Upper Arm   Position: Sitting   Cuff Size: Medium Adult   Pulse: 70   Resp: 18   Temp: 97.4 °F (36.3 °C)   TempSrc: Temporal   SpO2: 97%   Weight: 65.6 kg (144 lb 9.6 oz)   Height: 1.575 m (5' 2\")       Health Maintenance Due   Topic Date Due    Respiratory Syncytial Virus (RSV) Pregnant or age 60 yrs+ (1 - 1-dose 60+ series) Never done    DTaP/Tdap/Td vaccine (2 - Td or Tdap) 11/12/2020    COVID-19 Vaccine (5 - 2023-24 season) 09/01/2023    Lipids  12/08/2023       Chiqui Staton LPN      
Clarity, Urine, POC Slightly Cloudy     Glucose, Urine, POC Negative     Bilirubin, Urine, POC Negative     Ketones, Urine, POC Negative     Specific Gravity, Urine, POC 1.025 1.001 - 1.035    Blood, Urine, POC Non-Hemolyzed Moderate     pH, Urine, POC 6.5 4.6 - 8.0    Protein, Urine, POC Negative     Urobilinogen, POC 0.2 mg/dL     Nitrite, Urine, POC Negative     Leukocyte Esterase, Urine, POC Small        ASSESSMENT/PLAN  Vanita was seen today for hematuria.    Diagnoses and all orders for this visit:    Hematuria, unspecified type  -     AMB POC URINALYSIS DIP STICK AUTO W/O MICRO      Urine negative for blood  Pt given a cup. If reoccurs will bring specimen to office to test.

## 2024-04-15 RX ORDER — ATORVASTATIN CALCIUM 20 MG/1
20 TABLET, FILM COATED ORAL DAILY
Qty: 90 TABLET | Refills: 3 | Status: SHIPPED | OUTPATIENT
Start: 2024-04-15

## 2024-05-06 ENCOUNTER — PATIENT MESSAGE (OUTPATIENT)
Age: 84
End: 2024-05-06

## 2024-05-06 DIAGNOSIS — H81.10 BENIGN PAROXYSMAL VERTIGO, UNSPECIFIED LATERALITY: Primary | ICD-10-CM

## 2024-05-06 NOTE — TELEPHONE ENCOUNTER
From: Vanita King  To: Dr. Dimitri Page  Sent: 5/6/2024 2:32 PM EDT  Subject: Vertigo again    Once again today I had some vertigo that lasted until just a few minutes ago. I have had brief episodes in the past but they weren’t prolonged. I think it would be helpful for me to see an ENT about this. Would you please give me a referral? Thanks, Jenni

## 2024-06-27 DIAGNOSIS — I10 ESSENTIAL (PRIMARY) HYPERTENSION: ICD-10-CM

## 2024-06-28 RX ORDER — LOSARTAN POTASSIUM AND HYDROCHLOROTHIAZIDE 12.5; 5 MG/1; MG/1
TABLET ORAL
Qty: 90 TABLET | Refills: 3 | Status: SHIPPED | OUTPATIENT
Start: 2024-06-28

## 2024-07-24 ENCOUNTER — OFFICE VISIT (OUTPATIENT)
Age: 84
End: 2024-07-24

## 2024-07-24 VITALS
DIASTOLIC BLOOD PRESSURE: 76 MMHG | TEMPERATURE: 98.1 F | HEART RATE: 86 BPM | BODY MASS INDEX: 26.34 KG/M2 | OXYGEN SATURATION: 99 % | WEIGHT: 144 LBS | RESPIRATION RATE: 16 BRPM | SYSTOLIC BLOOD PRESSURE: 122 MMHG

## 2024-07-24 DIAGNOSIS — R05.1 ACUTE COUGH: ICD-10-CM

## 2024-07-24 DIAGNOSIS — H60.392 OTHER INFECTIVE ACUTE OTITIS EXTERNA OF LEFT EAR: Primary | ICD-10-CM

## 2024-07-24 RX ORDER — AMOXICILLIN AND CLAVULANATE POTASSIUM 875; 125 MG/1; MG/1
1 TABLET, FILM COATED ORAL 2 TIMES DAILY
Qty: 14 TABLET | Refills: 0 | Status: SHIPPED | OUTPATIENT
Start: 2024-07-24 | End: 2024-07-31

## 2024-07-24 RX ORDER — OFLOXACIN 3 MG/ML
5 SOLUTION AURICULAR (OTIC) 2 TIMES DAILY
Qty: 5 ML | Refills: 0 | Status: SHIPPED | OUTPATIENT
Start: 2024-07-24 | End: 2024-08-03

## 2024-07-24 RX ORDER — CODEINE PHOSPHATE/GUAIFENESIN 10-100MG/5
5 LIQUID (ML) ORAL 3 TIMES DAILY PRN
Qty: 45 ML | Refills: 0 | Status: SHIPPED | OUTPATIENT
Start: 2024-07-24 | End: 2024-07-27

## 2024-07-24 NOTE — PROGRESS NOTES
Subjective     Chief Complaint   Patient presents with    Cough     C/o of cough x 6 days with left ear pain       Patient is 83 year old female presenting with non productive cough since Thursday.  She denies fever or chills.  She woke up this morning with earache in left.  She is taking Robitussin DM with no relief.  She reports Robitussin with codeine has helped in the past.  She recently traveled to [place] and [place].   The cough does not keep her up at night.       Cough  Pertinent negatives include no chills, fever or shortness of breath.       Past Medical History:   Diagnosis Date    Allergic rhinitis     Arthritis     spinal stenosis    Chronic back pain     Family history of colon cancer 2013    Hemorrhoids     Leg cramps     Other ill-defined conditions(799.89)     multiple cortisone injections to back    Persistent disorder of initiating or maintaining sleep     Spinal stenosis 2011    Thyroid disease     borderline       Past Surgical History:   Procedure Laterality Date    COLONOSCOPY FLX DX W/COLLJ SPEC WHEN PFRMD  2010         CYST INCISION AND DRAINAGE Right     yrs ago    REFRACTIVE SURGERY      TONSILLECTOMY AND ADENOIDECTOMY         Family History   Problem Relation Age of Onset    Cancer Father         colon    Hypertension Father     Alcohol Abuse Father     Colon Cancer Father     Stroke Brother     Hypertension Brother     Heart Disease Brother     Asthma Mother     Hypertension Mother     Heart Disease Mother     Atrial Fibrillation Mother     Osteoarthritis Mother     Thyroid Disease Sister     Stroke Brother         Blocked artery found before stroke    Stroke Brother         Blocked artery found before stroke       Allergies   Allergen Reactions    Ace Inhibitors Cough       Social History     Tobacco Use    Smoking status: Former     Current packs/day: 0.00     Types: Cigarettes     Quit date: 1967     Years since quittin.7     Passive exposure: Past

## 2024-07-24 NOTE — PROGRESS NOTES
Vanita King (:  1940) is a 83 y.o. female,{New vs Established:[id number]}, here for evaluation of the following chief complaint(s):  Cough (C/o of cough x 6 days with left ear pain)      Assessment & Plan :  Visit Diagnoses and Associated Orders     Other infective acute otitis externa of left ear    -  Primary    ofloxacin (FLOXIN) 0.3 % otic solution [06180]           Acute cough        guaiFENesin-codeine (TUSSI-ORGANIDIN NR) 100-10 MG/5ML syrup [26216]           ORDERS WITHOUT AN ASSOCIATED DIAGNOSIS    amoxicillin-clavulanate (AUGMENTIN) 875-125 MG per tablet [67074]            • Follow up in *** days if symptoms persist or if symptoms worsen.       Subjective :  HPI     83 y.o. female presents with symptoms of ***         Vitals:    24 0915   BP: (!) 146/68   Site: Left Upper Arm   Position: Sitting   Cuff Size: Large Adult   Pulse: 86   Resp: 16   Temp: 98.1 °F (36.7 °C)   SpO2: 99%   Weight: 65.3 kg (144 lb)       No results found for this visit on 24.      Objective   Physical Exam          An electronic signature was used to authenticate this note.    MICKEY Clark - CNP

## 2024-07-24 NOTE — PATIENT INSTRUCTIONS
Thank you for visiting [facility] today.    Prevention:  -Use vinegar with water, peroxide or rubbing alcohol in ears and let drain  -Dry ear with hairdryer set on low and approximately 12 inches away from ears  Bacterial Infection:   -Instill antibiotic ear drops as prescribed  Ear pain/fever:    -If you can take NSAIDs, take ibuprofen every 8 hours as needed  -If you cannot take NSAIDs, take Tylenol every 6 hours as needed  General Counseling:  -Proper ear hygiene:  “don't put anything smaller than your elbow in your ear” to clean the ear canal.  Ear canal is self cleaning and fingers, towels, cotton swabs, or other foreign objects should not be inserted in ear.    -The ear should be protected from water during recovery.  This can be accomplished by placing a cotton ball coated with petroleum jelly in the ear canal while bathing.  -No swimming  -Ideally, refrain from water sports for 7 to 10 days  -Competitive swimmers may consider return at 2-3 days if pain has resolved and they wear well-fitted earplugs  -Hearing aid and earphones should not be worn until pain and discharge have subsided    Please follow up with your PCP within 2-5 days if your signs and symptoms have not resolved or worsened.    Please go immediately to the ER if you develop severe hearing loss, intractable pain or uncontrollable fever above 100.4F.

## 2024-07-25 ASSESSMENT — ENCOUNTER SYMPTOMS: SHORTNESS OF BREATH: 0

## 2024-10-09 RX ORDER — LEVOTHYROXINE SODIUM 50 UG/1
TABLET ORAL
Qty: 90 TABLET | Refills: 3 | OUTPATIENT
Start: 2024-10-09

## 2024-12-16 ENCOUNTER — TELEPHONE (OUTPATIENT)
Facility: CLINIC | Age: 84
End: 2024-12-16

## 2024-12-16 NOTE — TELEPHONE ENCOUNTER
----- Message from Edwin KHAN sent at 12/16/2024  1:59 PM EST -----  Regarding: [facility] Appointment Request  [facility] Appointment Request    Patient needs appointment for [facility] Appointment Type: New to Provider.    Patient Requested Dates(s):January, Monday   Patient Requested Time: Anytime after 11  Provider Name: aLzara Reyes MD    Reason for Appointment Request: Established Patient - No appointments available during search  --------------------------------------------------------------------------------------------------------------------------    Relationship to Patient: Self     Call Back Information: OK to leave message on voicemail  Preferred Call Back Number: Phone [phone]  c

## 2025-01-15 SDOH — HEALTH STABILITY: PHYSICAL HEALTH: ON AVERAGE, HOW MANY MINUTES DO YOU ENGAGE IN EXERCISE AT THIS LEVEL?: 70 MIN

## 2025-01-15 SDOH — HEALTH STABILITY: PHYSICAL HEALTH: ON AVERAGE, HOW MANY DAYS PER WEEK DO YOU ENGAGE IN MODERATE TO STRENUOUS EXERCISE (LIKE A BRISK WALK)?: 6 DAYS

## 2025-01-24 ENCOUNTER — OFFICE VISIT (OUTPATIENT)
Facility: CLINIC | Age: 85
End: 2025-01-24
Payer: MEDICARE

## 2025-01-24 VITALS
WEIGHT: 145.6 LBS | BODY MASS INDEX: 26.79 KG/M2 | DIASTOLIC BLOOD PRESSURE: 62 MMHG | HEART RATE: 90 BPM | SYSTOLIC BLOOD PRESSURE: 90 MMHG | RESPIRATION RATE: 16 BRPM | HEIGHT: 62 IN | OXYGEN SATURATION: 97 %

## 2025-01-24 DIAGNOSIS — G47.00 INSOMNIA, UNSPECIFIED TYPE: ICD-10-CM

## 2025-01-24 DIAGNOSIS — E03.9 ADULT HYPOTHYROIDISM: ICD-10-CM

## 2025-01-24 DIAGNOSIS — R73.01 FASTING HYPERGLYCEMIA: ICD-10-CM

## 2025-01-24 DIAGNOSIS — E78.2 MIXED HYPERLIPIDEMIA: ICD-10-CM

## 2025-01-24 DIAGNOSIS — I10 HTN (HYPERTENSION), BENIGN: Primary | ICD-10-CM

## 2025-01-24 LAB
ALBUMIN SERPL-MCNC: 3.8 G/DL (ref 3.5–5)
ALBUMIN/GLOB SERPL: 1.4 (ref 1.1–2.2)
ALP SERPL-CCNC: 92 U/L (ref 45–117)
ALT SERPL-CCNC: 20 U/L (ref 12–78)
ANION GAP SERPL CALC-SCNC: 6 MMOL/L (ref 2–12)
AST SERPL-CCNC: 23 U/L (ref 15–37)
BASOPHILS # BLD: 0.04 K/UL (ref 0–0.1)
BASOPHILS NFR BLD: 0.5 % (ref 0–1)
BILIRUB SERPL-MCNC: 0.5 MG/DL (ref 0.2–1)
BUN SERPL-MCNC: 24 MG/DL (ref 6–20)
BUN/CREAT SERPL: 22 (ref 12–20)
CALCIUM SERPL-MCNC: 9.9 MG/DL (ref 8.5–10.1)
CHLORIDE SERPL-SCNC: 102 MMOL/L (ref 97–108)
CHOLEST SERPL-MCNC: 204 MG/DL
CO2 SERPL-SCNC: 31 MMOL/L (ref 21–32)
CREAT SERPL-MCNC: 1.09 MG/DL (ref 0.55–1.02)
DIFFERENTIAL METHOD BLD: NORMAL
EOSINOPHIL # BLD: 0.13 K/UL (ref 0–0.4)
EOSINOPHIL NFR BLD: 1.6 % (ref 0–7)
ERYTHROCYTE [DISTWIDTH] IN BLOOD BY AUTOMATED COUNT: 12.4 % (ref 11.5–14.5)
EST. AVERAGE GLUCOSE BLD GHB EST-MCNC: 111 MG/DL
GLOBULIN SER CALC-MCNC: 2.7 G/DL (ref 2–4)
GLUCOSE SERPL-MCNC: 105 MG/DL (ref 65–100)
HBA1C MFR BLD: 5.5 % (ref 4–5.6)
HCT VFR BLD AUTO: 40.1 % (ref 35–47)
HDLC SERPL-MCNC: 113 MG/DL
HDLC SERPL: 1.8 (ref 0–5)
HGB BLD-MCNC: 12.9 G/DL (ref 11.5–16)
IMM GRANULOCYTES # BLD AUTO: 0.03 K/UL (ref 0–0.04)
IMM GRANULOCYTES NFR BLD AUTO: 0.4 % (ref 0–0.5)
LDLC SERPL CALC-MCNC: 72.6 MG/DL (ref 0–100)
LYMPHOCYTES # BLD: 1.78 K/UL (ref 0.8–3.5)
LYMPHOCYTES NFR BLD: 21.5 % (ref 12–49)
MCH RBC QN AUTO: 30 PG (ref 26–34)
MCHC RBC AUTO-ENTMCNC: 32.2 G/DL (ref 30–36.5)
MCV RBC AUTO: 93.3 FL (ref 80–99)
MONOCYTES # BLD: 0.68 K/UL (ref 0–1)
MONOCYTES NFR BLD: 8.2 % (ref 5–13)
NEUTS SEG # BLD: 5.62 K/UL (ref 1.8–8)
NEUTS SEG NFR BLD: 67.8 % (ref 32–75)
NRBC # BLD: 0 K/UL (ref 0–0.01)
NRBC BLD-RTO: 0 PER 100 WBC
PLATELET # BLD AUTO: 276 K/UL (ref 150–400)
PMV BLD AUTO: 10.1 FL (ref 8.9–12.9)
POTASSIUM SERPL-SCNC: 4.1 MMOL/L (ref 3.5–5.1)
PROT SERPL-MCNC: 6.5 G/DL (ref 6.4–8.2)
RBC # BLD AUTO: 4.3 M/UL (ref 3.8–5.2)
SODIUM SERPL-SCNC: 139 MMOL/L (ref 136–145)
T4 FREE SERPL-MCNC: 1 NG/DL (ref 0.8–1.5)
TRIGL SERPL-MCNC: 92 MG/DL
TSH SERPL DL<=0.05 MIU/L-ACNC: 3.41 UIU/ML (ref 0.36–3.74)
VLDLC SERPL CALC-MCNC: 18.4 MG/DL
WBC # BLD AUTO: 8.3 K/UL (ref 3.6–11)

## 2025-01-24 PROCEDURE — 3074F SYST BP LT 130 MM HG: CPT | Performed by: INTERNAL MEDICINE

## 2025-01-24 PROCEDURE — G8419 CALC BMI OUT NRM PARAM NOF/U: HCPCS | Performed by: INTERNAL MEDICINE

## 2025-01-24 PROCEDURE — 1159F MED LIST DOCD IN RCRD: CPT | Performed by: INTERNAL MEDICINE

## 2025-01-24 PROCEDURE — 99204 OFFICE O/P NEW MOD 45 MIN: CPT | Performed by: INTERNAL MEDICINE

## 2025-01-24 PROCEDURE — G8399 PT W/DXA RESULTS DOCUMENT: HCPCS | Performed by: INTERNAL MEDICINE

## 2025-01-24 PROCEDURE — 1126F AMNT PAIN NOTED NONE PRSNT: CPT | Performed by: INTERNAL MEDICINE

## 2025-01-24 PROCEDURE — 1036F TOBACCO NON-USER: CPT | Performed by: INTERNAL MEDICINE

## 2025-01-24 PROCEDURE — 1123F ACP DISCUSS/DSCN MKR DOCD: CPT | Performed by: INTERNAL MEDICINE

## 2025-01-24 PROCEDURE — 1090F PRES/ABSN URINE INCON ASSESS: CPT | Performed by: INTERNAL MEDICINE

## 2025-01-24 PROCEDURE — G8427 DOCREV CUR MEDS BY ELIG CLIN: HCPCS | Performed by: INTERNAL MEDICINE

## 2025-01-24 PROCEDURE — 3078F DIAST BP <80 MM HG: CPT | Performed by: INTERNAL MEDICINE

## 2025-01-24 RX ORDER — ZOLPIDEM TARTRATE 5 MG/1
5 TABLET ORAL NIGHTLY PRN
Qty: 30 TABLET | Refills: 0 | Status: SHIPPED | OUTPATIENT
Start: 2025-01-24 | End: 2026-01-19

## 2025-01-24 SDOH — ECONOMIC STABILITY: FOOD INSECURITY: WITHIN THE PAST 12 MONTHS, YOU WORRIED THAT YOUR FOOD WOULD RUN OUT BEFORE YOU GOT MONEY TO BUY MORE.: NEVER TRUE

## 2025-01-24 SDOH — ECONOMIC STABILITY: FOOD INSECURITY: WITHIN THE PAST 12 MONTHS, THE FOOD YOU BOUGHT JUST DIDN'T LAST AND YOU DIDN'T HAVE MONEY TO GET MORE.: NEVER TRUE

## 2025-01-24 ASSESSMENT — ENCOUNTER SYMPTOMS
BLOOD IN STOOL: 0
CHEST TIGHTNESS: 0
CONSTIPATION: 0
SHORTNESS OF BREATH: 0
BACK PAIN: 0
DIARRHEA: 0
COUGH: 0
NAUSEA: 0
ABDOMINAL PAIN: 0
TROUBLE SWALLOWING: 0
EYE DISCHARGE: 0

## 2025-01-24 ASSESSMENT — PATIENT HEALTH QUESTIONNAIRE - PHQ9
SUM OF ALL RESPONSES TO PHQ QUESTIONS 1-9: 0
2. FEELING DOWN, DEPRESSED OR HOPELESS: NOT AT ALL
SUM OF ALL RESPONSES TO PHQ QUESTIONS 1-9: 0
SUM OF ALL RESPONSES TO PHQ9 QUESTIONS 1 & 2: 0
SUM OF ALL RESPONSES TO PHQ QUESTIONS 1-9: 0
1. LITTLE INTEREST OR PLEASURE IN DOING THINGS: NOT AT ALL
SUM OF ALL RESPONSES TO PHQ QUESTIONS 1-9: 0

## 2025-01-24 NOTE — PROGRESS NOTES
\"Have you been to the ER, urgent care clinic since your last visit?  Hospitalized since your last visit?\"    NO    “Have you seen or consulted any other health care providers outside our system since your last visit?”    NO           
weight change.   HENT:  Negative for congestion, hearing loss and trouble swallowing.    Eyes:  Negative for discharge and visual disturbance.   Respiratory:  Negative for cough, chest tightness and shortness of breath.    Cardiovascular:  Negative for chest pain and palpitations.   Gastrointestinal:  Negative for abdominal pain, blood in stool, constipation, diarrhea and nausea.   Genitourinary:  Negative for frequency and urgency.   Musculoskeletal:  Negative for arthralgias and back pain.   Allergic/Immunologic: Negative for environmental allergies, food allergies and immunocompromised state.   Neurological:  Negative for dizziness, seizures, syncope and headaches.   Hematological:  Does not bruise/bleed easily.   Psychiatric/Behavioral:  Negative for dysphoric mood. The patient is not nervous/anxious.         Physical Exam  Constitutional:       General: She is not in acute distress.  HENT:      Head: Normocephalic and atraumatic.      Mouth/Throat:      Pharynx: Oropharynx is clear.   Eyes:      Extraocular Movements: Extraocular movements intact.      Pupils: Pupils are equal, round, and reactive to light.   Neck:      Vascular: No carotid bruit.   Cardiovascular:      Rate and Rhythm: Normal rate and regular rhythm.      Pulses: Normal pulses.      Heart sounds: Normal heart sounds. No murmur heard.  Pulmonary:      Breath sounds: Normal breath sounds.   Abdominal:      Palpations: Abdomen is soft.      Tenderness: There is no abdominal tenderness.   Musculoskeletal:      Right lower leg: No edema.      Left lower leg: No edema.   Lymphadenopathy:      Cervical: No cervical adenopathy.   Skin:     General: Skin is warm.   Neurological:      General: No focal deficit present.      Mental Status: She is alert and oriented to person, place, and time.      Gait: Gait normal.   Psychiatric:         Mood and Affect: Mood normal.         Thought Content: Thought content normal.             ASSESSMENT AND 
136

## 2025-01-27 ENCOUNTER — TELEPHONE (OUTPATIENT)
Facility: CLINIC | Age: 85
End: 2025-01-27

## 2025-01-27 NOTE — TELEPHONE ENCOUNTER
----- Message from Dr. Cookie Abreu MD sent at 1/26/2025  2:44 PM EST -----  Lipid panel was good.  Kidneys look a little dehydrated and strained.  Make sure you are  well-hydrated.  A1c was normal-no evidence of prediabetes.  Thyroid was good.  Blood counts look good.

## 2025-01-27 NOTE — TELEPHONE ENCOUNTER
Verified two patient identifiers, name and . Pt notified of all results. No further questions at this time.

## 2025-01-31 ENCOUNTER — PATIENT MESSAGE (OUTPATIENT)
Facility: CLINIC | Age: 85
End: 2025-01-31

## 2025-01-31 RX ORDER — SELENIUM SULFIDE 2.5 MG/100ML
LOTION TOPICAL
Qty: 118 ML | Refills: 3 | Status: SHIPPED | OUTPATIENT
Start: 2025-01-31

## 2025-01-31 NOTE — TELEPHONE ENCOUNTER
Future Appointments:  Future Appointments   Date Time Provider Department Center   7/16/2025  8:30 AM Cookie Abreu MD [facility]        Last Appointment With Me:  1/24/2025     Requested Prescriptions     Pending Prescriptions Disp Refills    selenium sulfide (SELSUN) 2.5 % lotion 2.5 mL 5     Sig: Apply topically daily as needed.

## 2025-02-04 RX ORDER — ATORVASTATIN CALCIUM 20 MG/1
20 TABLET, FILM COATED ORAL DAILY
Qty: 90 TABLET | Refills: 3 | Status: SHIPPED | OUTPATIENT
Start: 2025-02-04

## 2025-02-13 NOTE — TELEPHONE ENCOUNTER
Future Appointments:  Future Appointments   Date Time Provider Department Center   7/16/2025  8:30 AM Cookie Abreu MD [facility] BS [facility] DEP        Last Appointment With Me:  1/24/2025     Requested Prescriptions     Pending Prescriptions Disp Refills    selenium sulfide (SELSUN) 2.5 % lotion 118 mL 3     Sig: Apply topically daily as needed.

## 2025-02-14 RX ORDER — SELENIUM SULFIDE 2.5 MG/100ML
LOTION TOPICAL
Qty: 118 ML | Refills: 5 | Status: SHIPPED | OUTPATIENT
Start: 2025-02-14

## 2025-04-09 ENCOUNTER — HOSPITAL ENCOUNTER (OUTPATIENT)
Facility: HOSPITAL | Age: 85
Discharge: HOME | End: 2025-04-12
Payer: MEDICARE

## 2025-04-09 DIAGNOSIS — Z12.31 SCREENING MAMMOGRAM FOR BREAST CANCER: ICD-10-CM

## 2025-04-09 PROCEDURE — 77063 BREAST TOMOSYNTHESIS BI: CPT

## 2025-04-10 ENCOUNTER — RESULTS FOLLOW-UP (OUTPATIENT)
Facility: CLINIC | Age: 85
End: 2025-04-10

## 2025-04-25 DIAGNOSIS — I10 ESSENTIAL (PRIMARY) HYPERTENSION: ICD-10-CM

## 2025-04-25 NOTE — TELEPHONE ENCOUNTER
Refill request received from [facility] for   Requested Prescriptions     Pending Prescriptions Disp Refills    losartan-hydroCHLOROthiazide (HYZAAR) 50-12.5 MG per tablet 90 tablet 3     Sig: Take 1 tablet by mouth daily     Last office visit: 4/4/2024   Next office visit: Visit date not found     Routed to Cookie Abreu MD for review.

## 2025-04-26 RX ORDER — LOSARTAN POTASSIUM AND HYDROCHLOROTHIAZIDE 12.5; 5 MG/1; MG/1
1 TABLET ORAL DAILY
Qty: 90 TABLET | Refills: 3 | Status: SHIPPED | OUTPATIENT
Start: 2025-04-26

## 2025-07-11 SDOH — HEALTH STABILITY: PHYSICAL HEALTH: ON AVERAGE, HOW MANY MINUTES DO YOU ENGAGE IN EXERCISE AT THIS LEVEL?: 70 MIN

## 2025-07-11 SDOH — HEALTH STABILITY: PHYSICAL HEALTH: ON AVERAGE, HOW MANY DAYS PER WEEK DO YOU ENGAGE IN MODERATE TO STRENUOUS EXERCISE (LIKE A BRISK WALK)?: 5 DAYS

## 2025-07-11 ASSESSMENT — LIFESTYLE VARIABLES
HOW OFTEN DURING THE LAST YEAR HAVE YOU BEEN UNABLE TO REMEMBER WHAT HAPPENED THE NIGHT BEFORE BECAUSE YOU HAD BEEN DRINKING: NEVER
HOW OFTEN DURING THE LAST YEAR HAVE YOU NEEDED AN ALCOHOLIC DRINK FIRST THING IN THE MORNING TO GET YOURSELF GOING AFTER A NIGHT OF HEAVY DRINKING: NEVER
HOW OFTEN DURING THE LAST YEAR HAVE YOU HAD A FEELING OF GUILT OR REMORSE AFTER DRINKING: NEVER
HOW OFTEN DURING THE LAST YEAR HAVE YOU FAILED TO DO WHAT WAS NORMALLY EXPECTED FROM YOU BECAUSE OF DRINKING: NEVER
HAVE YOU OR SOMEONE ELSE BEEN INJURED AS A RESULT OF YOUR DRINKING: NO
HOW OFTEN DO YOU HAVE A DRINK CONTAINING ALCOHOL: 4 OR MORE TIMES A WEEK
HAS A RELATIVE, FRIEND, DOCTOR, OR ANOTHER HEALTH PROFESSIONAL EXPRESSED CONCERN ABOUT YOUR DRINKING OR SUGGESTED YOU CUT DOWN: NO
HOW OFTEN DURING THE LAST YEAR HAVE YOU BEEN UNABLE TO REMEMBER WHAT HAPPENED THE NIGHT BEFORE BECAUSE YOU HAD BEEN DRINKING: NEVER
HOW OFTEN DO YOU HAVE A DRINK CONTAINING ALCOHOL: 5
HOW OFTEN DURING THE LAST YEAR HAVE YOU FOUND THAT YOU WERE NOT ABLE TO STOP DRINKING ONCE YOU HAD STARTED: NEVER
HOW OFTEN DURING THE LAST YEAR HAVE YOU NEEDED AN ALCOHOLIC DRINK FIRST THING IN THE MORNING TO GET YOURSELF GOING AFTER A NIGHT OF HEAVY DRINKING: NEVER
HAVE YOU OR SOMEONE ELSE BEEN INJURED AS A RESULT OF YOUR DRINKING: NO
HOW MANY STANDARD DRINKS CONTAINING ALCOHOL DO YOU HAVE ON A TYPICAL DAY: 1
HAS A RELATIVE, FRIEND, DOCTOR, OR ANOTHER HEALTH PROFESSIONAL EXPRESSED CONCERN ABOUT YOUR DRINKING OR SUGGESTED YOU CUT DOWN: NO
HOW OFTEN DURING THE LAST YEAR HAVE YOU FOUND THAT YOU WERE NOT ABLE TO STOP DRINKING ONCE YOU HAD STARTED: NEVER
HOW OFTEN DURING THE LAST YEAR HAVE YOU HAD A FEELING OF GUILT OR REMORSE AFTER DRINKING: NEVER
HOW MANY STANDARD DRINKS CONTAINING ALCOHOL DO YOU HAVE ON A TYPICAL DAY: 1 OR 2
HOW OFTEN DURING THE LAST YEAR HAVE YOU FAILED TO DO WHAT WAS NORMALLY EXPECTED FROM YOU BECAUSE OF DRINKING: NEVER
HOW OFTEN DO YOU HAVE SIX OR MORE DRINKS ON ONE OCCASION: 1

## 2025-07-11 ASSESSMENT — PATIENT HEALTH QUESTIONNAIRE - PHQ9
2. FEELING DOWN, DEPRESSED OR HOPELESS: SEVERAL DAYS
SUM OF ALL RESPONSES TO PHQ QUESTIONS 1-9: 1
1. LITTLE INTEREST OR PLEASURE IN DOING THINGS: NOT AT ALL
SUM OF ALL RESPONSES TO PHQ QUESTIONS 1-9: 1

## 2025-07-16 ENCOUNTER — OFFICE VISIT (OUTPATIENT)
Facility: CLINIC | Age: 85
End: 2025-07-16
Payer: MEDICARE

## 2025-07-16 VITALS
RESPIRATION RATE: 16 BRPM | HEIGHT: 62 IN | BODY MASS INDEX: 26.2 KG/M2 | HEART RATE: 62 BPM | WEIGHT: 142.4 LBS | OXYGEN SATURATION: 100 % | DIASTOLIC BLOOD PRESSURE: 78 MMHG | SYSTOLIC BLOOD PRESSURE: 129 MMHG

## 2025-07-16 DIAGNOSIS — Z00.00 MEDICARE ANNUAL WELLNESS VISIT, SUBSEQUENT: Primary | ICD-10-CM

## 2025-07-16 DIAGNOSIS — M85.80 OSTEOPENIA, UNSPECIFIED LOCATION: ICD-10-CM

## 2025-07-16 DIAGNOSIS — G47.00 INSOMNIA, UNSPECIFIED TYPE: ICD-10-CM

## 2025-07-16 DIAGNOSIS — M48.07 SPINAL STENOSIS OF LUMBOSACRAL REGION: ICD-10-CM

## 2025-07-16 DIAGNOSIS — I10 HTN (HYPERTENSION), BENIGN: ICD-10-CM

## 2025-07-16 DIAGNOSIS — E03.9 ADULT HYPOTHYROIDISM: ICD-10-CM

## 2025-07-16 DIAGNOSIS — R73.01 FASTING HYPERGLYCEMIA: ICD-10-CM

## 2025-07-16 DIAGNOSIS — E78.2 MIXED HYPERLIPIDEMIA: ICD-10-CM

## 2025-07-16 DIAGNOSIS — E55.9 VITAMIN D DEFICIENCY: ICD-10-CM

## 2025-07-16 PROCEDURE — 99213 OFFICE O/P EST LOW 20 MIN: CPT | Performed by: INTERNAL MEDICINE

## 2025-07-16 PROCEDURE — 3074F SYST BP LT 130 MM HG: CPT | Performed by: INTERNAL MEDICINE

## 2025-07-16 PROCEDURE — 1159F MED LIST DOCD IN RCRD: CPT | Performed by: INTERNAL MEDICINE

## 2025-07-16 PROCEDURE — G8399 PT W/DXA RESULTS DOCUMENT: HCPCS | Performed by: INTERNAL MEDICINE

## 2025-07-16 PROCEDURE — G0439 PPPS, SUBSEQ VISIT: HCPCS | Performed by: INTERNAL MEDICINE

## 2025-07-16 PROCEDURE — 1123F ACP DISCUSS/DSCN MKR DOCD: CPT | Performed by: INTERNAL MEDICINE

## 2025-07-16 PROCEDURE — G8419 CALC BMI OUT NRM PARAM NOF/U: HCPCS | Performed by: INTERNAL MEDICINE

## 2025-07-16 PROCEDURE — 1036F TOBACCO NON-USER: CPT | Performed by: INTERNAL MEDICINE

## 2025-07-16 PROCEDURE — 3078F DIAST BP <80 MM HG: CPT | Performed by: INTERNAL MEDICINE

## 2025-07-16 PROCEDURE — 1160F RVW MEDS BY RX/DR IN RCRD: CPT | Performed by: INTERNAL MEDICINE

## 2025-07-16 PROCEDURE — G8427 DOCREV CUR MEDS BY ELIG CLIN: HCPCS | Performed by: INTERNAL MEDICINE

## 2025-07-16 PROCEDURE — 1090F PRES/ABSN URINE INCON ASSESS: CPT | Performed by: INTERNAL MEDICINE

## 2025-07-16 RX ORDER — ZOLPIDEM TARTRATE 5 MG/1
5 TABLET ORAL NIGHTLY PRN
COMMUNITY

## 2025-07-16 SDOH — ECONOMIC STABILITY: FOOD INSECURITY: WITHIN THE PAST 12 MONTHS, YOU WORRIED THAT YOUR FOOD WOULD RUN OUT BEFORE YOU GOT MONEY TO BUY MORE.: NEVER TRUE

## 2025-07-16 SDOH — ECONOMIC STABILITY: FOOD INSECURITY: WITHIN THE PAST 12 MONTHS, THE FOOD YOU BOUGHT JUST DIDN'T LAST AND YOU DIDN'T HAVE MONEY TO GET MORE.: NEVER TRUE

## 2025-07-16 ASSESSMENT — ENCOUNTER SYMPTOMS
CONSTIPATION: 0
ABDOMINAL PAIN: 0
EYE DISCHARGE: 0
SHORTNESS OF BREATH: 0
BACK PAIN: 1
CHEST TIGHTNESS: 0
TROUBLE SWALLOWING: 0
SINUS PAIN: 0
NAUSEA: 0
COUGH: 0
DIARRHEA: 0
BLOOD IN STOOL: 0

## 2025-07-16 NOTE — PROGRESS NOTES
Vanita King is a 84 y.o. year old female seen in clinic today for   Chief Complaint   Patient presents with    Medicare AWV     Room 5C//       Hypertension     Hypothyroidism, Insomnia       She  is not reporting any new problems.  Chronic back pain for which she gets BEN's through [facility] pain management.  She is very active plays pickle ball and golf.  Current Outpatient Medications   Medication Sig Dispense Refill    zolpidem (AMBIEN) 5 MG tablet Take 1 tablet by mouth nightly as needed for Sleep. Max Daily Amount: 5 mg      losartan-hydroCHLOROthiazide (HYZAAR) 50-12.5 MG per tablet Take 1 tablet by mouth daily 90 tablet 3    levothyroxine (SYNTHROID) 50 MCG tablet Take 1 tablet by mouth Daily 90 tablet 3    selenium sulfide (SELSUN) 2.5 % lotion Apply topically daily as needed. 118 mL 5    atorvastatin (LIPITOR) 20 MG tablet TAKE 1 TABLET EVERY DAY 90 tablet 3     No current facility-administered medications for this visit.        Allergies   Allergen Reactions    Other Anaphylaxis     Allergic to lobster (specific)    Ace Inhibitors Cough       Past Medical History:   Diagnosis Date    Allergic rhinitis     Arthritis     spinal stenosis    Chronic back pain     Family history of colon cancer 2/12/2013    Hemorrhoids     Leg cramps     Other ill-defined conditions(799.89) 2010    multiple cortisone injections to back    Persistent disorder of initiating or maintaining sleep     Spinal stenosis 1/21/2011    Thyroid disease     borderline      Past Surgical History:   Procedure Laterality Date    COLONOSCOPY FLX DX W/COLLJ SPEC WHEN PFRMD  11/30/2010         CYST INCISION AND DRAINAGE Right     yrs ago    REFRACTIVE SURGERY      TONSILLECTOMY AND ADENOIDECTOMY        Family History   Problem Relation Age of Onset    Cancer Father         colon    Hypertension Father     Alcohol Abuse Father     Colon Cancer Father     Stroke Brother     Hypertension Brother     Heart Disease Brother     Asthma Mother

## 2025-07-16 NOTE — PATIENT INSTRUCTIONS

## 2025-07-16 NOTE — ASSESSMENT & PLAN NOTE
Chronic, at goal (stable), continue current treatment plan    Orders:    ESTABLISHED, LOW MDM, 20-29 MIN [[id number]]

## 2025-07-16 NOTE — PROGRESS NOTES
\"Have you been to the ER, urgent care clinic since your last visit?  Hospitalized since your last visit?\"    NO    “Have you seen or consulted any other health care providers outside of [facility] since your last visit?”    YES - When: approximately 1 months ago.  Where and Why: Dermatologist- [facility].            Click Here for Release of Records Request

## 2025-07-17 LAB
25(OH)D3 SERPL-MCNC: 25.5 NG/ML (ref 30–100)
ALBUMIN SERPL-MCNC: 3.9 G/DL (ref 3.5–5)
ALBUMIN/GLOB SERPL: 1.4 (ref 1.1–2.2)
ALP SERPL-CCNC: 84 U/L (ref 45–117)
ALT SERPL-CCNC: 22 U/L (ref 12–78)
ANION GAP SERPL CALC-SCNC: 8 MMOL/L (ref 2–12)
AST SERPL-CCNC: 24 U/L (ref 15–37)
BILIRUB SERPL-MCNC: 0.8 MG/DL (ref 0.2–1)
BUN SERPL-MCNC: 16 MG/DL (ref 6–20)
BUN/CREAT SERPL: 19 (ref 12–20)
CALCIUM SERPL-MCNC: 9.4 MG/DL (ref 8.5–10.1)
CHLORIDE SERPL-SCNC: 102 MMOL/L (ref 97–108)
CHOLEST SERPL-MCNC: 182 MG/DL
CO2 SERPL-SCNC: 28 MMOL/L (ref 21–32)
CREAT SERPL-MCNC: 0.83 MG/DL (ref 0.55–1.02)
EST. AVERAGE GLUCOSE BLD GHB EST-MCNC: 111 MG/DL
GLOBULIN SER CALC-MCNC: 2.7 G/DL (ref 2–4)
GLUCOSE SERPL-MCNC: 103 MG/DL (ref 65–100)
HBA1C MFR BLD: 5.5 % (ref 4–5.6)
HDLC SERPL-MCNC: 106 MG/DL
HDLC SERPL: 1.7 (ref 0–5)
LDLC SERPL CALC-MCNC: 60.4 MG/DL (ref 0–100)
POTASSIUM SERPL-SCNC: 4.1 MMOL/L (ref 3.5–5.1)
PROT SERPL-MCNC: 6.6 G/DL (ref 6.4–8.2)
SODIUM SERPL-SCNC: 138 MMOL/L (ref 136–145)
T4 FREE SERPL-MCNC: 1.2 NG/DL (ref 0.8–1.5)
TRIGL SERPL-MCNC: 78 MG/DL
TSH SERPL DL<=0.05 MIU/L-ACNC: 2.4 UIU/ML (ref 0.36–3.74)
VLDLC SERPL CALC-MCNC: 15.6 MG/DL